# Patient Record
Sex: MALE | Race: BLACK OR AFRICAN AMERICAN | Employment: FULL TIME | URBAN - METROPOLITAN AREA
[De-identification: names, ages, dates, MRNs, and addresses within clinical notes are randomized per-mention and may not be internally consistent; named-entity substitution may affect disease eponyms.]

---

## 2019-04-08 ENCOUNTER — OFFICE VISIT (OUTPATIENT)
Dept: FAMILY MEDICINE CLINIC | Age: 49
End: 2019-04-08
Payer: COMMERCIAL

## 2019-04-08 VITALS
SYSTOLIC BLOOD PRESSURE: 156 MMHG | DIASTOLIC BLOOD PRESSURE: 96 MMHG | WEIGHT: 205 LBS | HEIGHT: 72 IN | BODY MASS INDEX: 27.77 KG/M2 | HEART RATE: 86 BPM

## 2019-04-08 DIAGNOSIS — F43.9 STRESS AT HOME: ICD-10-CM

## 2019-04-08 DIAGNOSIS — K03.81 CRACKED TOOTH: ICD-10-CM

## 2019-04-08 DIAGNOSIS — Z12.11 SCREENING FOR COLON CANCER: Primary | ICD-10-CM

## 2019-04-08 DIAGNOSIS — I10 HYPERTENSION, UNSPECIFIED TYPE: ICD-10-CM

## 2019-04-08 LAB
A/G RATIO: 1.4 (ref 1.1–2.2)
ALBUMIN SERPL-MCNC: 4.5 G/DL (ref 3.4–5)
ALP BLD-CCNC: 77 U/L (ref 40–129)
ALT SERPL-CCNC: 17 U/L (ref 10–40)
ANION GAP SERPL CALCULATED.3IONS-SCNC: 12 MMOL/L (ref 3–16)
AST SERPL-CCNC: 21 U/L (ref 15–37)
BILIRUB SERPL-MCNC: 0.5 MG/DL (ref 0–1)
BUN BLDV-MCNC: 13 MG/DL (ref 7–20)
CALCIUM SERPL-MCNC: 9.2 MG/DL (ref 8.3–10.6)
CHLORIDE BLD-SCNC: 98 MMOL/L (ref 99–110)
CO2: 28 MMOL/L (ref 21–32)
CREAT SERPL-MCNC: 0.7 MG/DL (ref 0.9–1.3)
GFR AFRICAN AMERICAN: >60
GFR NON-AFRICAN AMERICAN: >60
GLOBULIN: 3.3 G/DL
GLUCOSE BLD-MCNC: 236 MG/DL (ref 70–99)
HCT VFR BLD CALC: 39.5 % (ref 40.5–52.5)
HEMOGLOBIN: 12.4 G/DL (ref 13.5–17.5)
MCH RBC QN AUTO: 22.2 PG (ref 26–34)
MCHC RBC AUTO-ENTMCNC: 31.5 G/DL (ref 31–36)
MCV RBC AUTO: 70.7 FL (ref 80–100)
PDW BLD-RTO: 14.7 % (ref 12.4–15.4)
PLATELET # BLD: 283 K/UL (ref 135–450)
PMV BLD AUTO: 8.9 FL (ref 5–10.5)
POTASSIUM SERPL-SCNC: 4.6 MMOL/L (ref 3.5–5.1)
RBC # BLD: 5.59 M/UL (ref 4.2–5.9)
SODIUM BLD-SCNC: 138 MMOL/L (ref 136–145)
TOTAL PROTEIN: 7.8 G/DL (ref 6.4–8.2)
TSH SERPL DL<=0.05 MIU/L-ACNC: 1.55 UIU/ML (ref 0.27–4.2)
WBC # BLD: 4.4 K/UL (ref 4–11)

## 2019-04-08 PROCEDURE — 36415 COLL VENOUS BLD VENIPUNCTURE: CPT | Performed by: FAMILY MEDICINE

## 2019-04-08 PROCEDURE — 99203 OFFICE O/P NEW LOW 30 MIN: CPT | Performed by: FAMILY MEDICINE

## 2019-04-08 RX ORDER — AMLODIPINE BESYLATE 5 MG/1
5 TABLET ORAL DAILY
Qty: 30 TABLET | Refills: 0 | Status: SHIPPED | OUTPATIENT
Start: 2019-04-08 | End: 2019-05-08

## 2019-04-08 SDOH — HEALTH STABILITY: MENTAL HEALTH: HOW OFTEN DO YOU HAVE A DRINK CONTAINING ALCOHOL?: NEVER

## 2019-04-08 ASSESSMENT — PATIENT HEALTH QUESTIONNAIRE - PHQ9
SUM OF ALL RESPONSES TO PHQ9 QUESTIONS 1 & 2: 0
SUM OF ALL RESPONSES TO PHQ QUESTIONS 1-9: 0
2. FEELING DOWN, DEPRESSED OR HOPELESS: 0
SUM OF ALL RESPONSES TO PHQ QUESTIONS 1-9: 0
2. FEELING DOWN, DEPRESSED OR HOPELESS: 0
SUM OF ALL RESPONSES TO PHQ QUESTIONS 1-9: 0
1. LITTLE INTEREST OR PLEASURE IN DOING THINGS: 0
SUM OF ALL RESPONSES TO PHQ QUESTIONS 1-9: 0

## 2019-04-08 ASSESSMENT — ENCOUNTER SYMPTOMS
VOMITING: 0
TROUBLE SWALLOWING: 0
RHINORRHEA: 0
SINUS PRESSURE: 0
DIARRHEA: 0
FACIAL SWELLING: 0
SHORTNESS OF BREATH: 0
COUGH: 0
ANAL BLEEDING: 0
EYE DISCHARGE: 0
ABDOMINAL PAIN: 0
WHEEZING: 0
SORE THROAT: 0
NAUSEA: 0
BLOOD IN STOOL: 0
CHEST TIGHTNESS: 0

## 2019-04-08 NOTE — PROGRESS NOTES
2019    Lalo Moss (:  1970) is a 46 y.o. male, here to establish care with the PCP. Patient stated overall he feels well but he is here for  evaluation of the following medical concerns:    1. HTN- patient stated that he has not had this before, is not on medication, believes it is elevated today due to several concerns, including a cracked tooth, stress at home, and the fact that he had poor diet. He  Is not on medication. He has lost over 100 pounds over the past several years. He denied chest pain, sob, or syncope. 2.  Screening colonoscopy- has not had this and needs an order to have the procedure. 3.  Teeth- apparently has a \"cracked\" tooth and has a dental appointment this week. 4.  Overweight- again patient lost over 100 pounds in the past.  Is trying to eat a balanced healthy diet but recently had a diet high in sodium. HPI    Review of Systems   Constitutional: Negative for activity change, fatigue, fever and unexpected weight change. HENT: Negative for congestion, ear pain, facial swelling, hearing loss, rhinorrhea, sinus pressure, sore throat and trouble swallowing. Eyes: Negative for discharge and visual disturbance. Respiratory: Negative for cough, chest tightness, shortness of breath and wheezing. Cardiovascular: Negative for chest pain, palpitations and leg swelling. Gastrointestinal: Negative for abdominal pain, anal bleeding, blood in stool, diarrhea, nausea and vomiting. Endocrine: Negative for cold intolerance, heat intolerance, polydipsia and polyphagia. Genitourinary: Negative for decreased urine volume, discharge, dysuria, frequency, genital sores, penile pain, testicular pain and urgency. Musculoskeletal: Negative for arthralgias. Skin: Negative for rash. Allergic/Immunologic: Negative for food allergies. Neurological: Positive for headaches. Negative for dizziness, syncope and light-headedness.         New glasses today Position:  Supine Sitting   Cuff Size:  Medium Adult Medium Adult   Pulse: 86     Weight: 205 lb (93 kg)     Height: 6' (1.829 m)       Estimated body mass index is 27.8 kg/m² as calculated from the following:    Height as of this encounter: 6' (1.829 m). Weight as of this encounter: 205 lb (93 kg). Chemistry    No results found for: NA, K, CL, CO2, BUN, CREATININE No results found for: CALCIUM, ALKPHOS, AST, ALT, BILITOT       No results found for: WBC, HGB, HCT, MCV, PLT  Lab Results   Component Value Date    LABA1C 8.4 05/14/2013     Lab Results   Component Value Date    .4 05/14/2013     Lab Results   Component Value Date    LABA1C 8.4 05/14/2013     No components found for: CHLPL  No results found for: TRIG  No results found for: HDL  No results found for: LDLCALC  No results found for: LABVLDL    Physical Exam   Constitutional: He is oriented to person, place, and time. He appears well-developed and well-nourished. HENT:   Head: Normocephalic and atraumatic. Right Ear: External ear normal.   Left Ear: External ear normal.   Nose: Nose normal.   Mouth/Throat: Oropharynx is clear and moist. No oropharyngeal exudate. Eyes: Pupils are equal, round, and reactive to light. Conjunctivae are normal.   Neck: Neck supple. No thyromegaly present. Cardiovascular: Normal rate, regular rhythm, normal heart sounds and intact distal pulses. No murmur heard. Pulmonary/Chest: Effort normal and breath sounds normal. He has no wheezes. He has no rales. Abdominal: Soft. Bowel sounds are normal. There is no tenderness. There is no guarding. Musculoskeletal: Normal range of motion. He exhibits no edema. Lymphadenopathy:     He has no cervical adenopathy. Neurological: He is alert and oriented to person, place, and time. He displays normal reflexes. No cranial nerve deficit. Skin: No rash noted. He is not diaphoretic. Psychiatric: He has a normal mood and affect.  His behavior is normal. Judgment and thought content normal.       ASSESSMENT/PLAN:  1. Screening for colon cancer  Patient will have colonoscopy ordered. 2. Cracked tooth  Stable  He has an appointment with a Dentist this week. 3. Stress at home  Stable  He believes this is associated with his HTN at this time. 4. Hypertension, unspecified type  Had three readings in the office, no other readings. Educated the patient on the need to check BP at home. The patient's BP is elevated and concerned due to his past problems I prescribed a blood pressure medication and informed the patient to check it tomorrow and let me know the reading if elevated we will start the medication. Keep a log of BP readings. Monitor sodium, diet, and exercise. He had his questions answered during the visit. He will rtc in three months. If BP is still elevated please rtc sooner. No follow-ups on file.

## 2019-04-08 NOTE — PATIENT INSTRUCTIONS
Please check BP and keep a log. Send me a message  Please consider BP medication if your numbers do not improve, very important. Reduce sodium and monitor your diet. Colonoscopy has been ordered. Please rtc in three months sooner if BP is elevated.

## 2019-05-07 ENCOUNTER — TELEPHONE (OUTPATIENT)
Dept: FAMILY MEDICINE CLINIC | Age: 49
End: 2019-05-07

## 2019-05-07 NOTE — TELEPHONE ENCOUNTER
Patient was given the results of the labs from 4/8/19. Patient informed that Dr. Mesfin Espitia wants him to schedule a lab visit for lipids, Hgb A1c, and for BP check. Patient states he works long hours and doesn't know when he will be able to schedule. JAYYI.

## 2021-10-05 ENCOUNTER — HOSPITAL ENCOUNTER (OUTPATIENT)
Dept: GENERAL RADIOLOGY | Age: 51
Discharge: HOME | End: 2021-10-05

## 2021-10-05 ENCOUNTER — HOSPITAL ENCOUNTER (OUTPATIENT)
Age: 51
Discharge: HOME | End: 2021-10-05

## 2021-10-05 DIAGNOSIS — S10.93XA CONTUSION OF NECK, INITIAL ENCOUNTER: ICD-10-CM

## 2021-10-05 PROCEDURE — 72040 X-RAY EXAM NECK SPINE 2-3 VW: CPT

## 2024-06-15 ENCOUNTER — APPOINTMENT (OUTPATIENT)
Dept: GENERAL RADIOLOGY | Age: 54
DRG: 617 | End: 2024-06-15
Payer: COMMERCIAL

## 2024-06-15 ENCOUNTER — HOSPITAL ENCOUNTER (INPATIENT)
Age: 54
LOS: 5 days | Discharge: HOME | DRG: 617 | End: 2024-06-20
Attending: INTERNAL MEDICINE | Admitting: INTERNAL MEDICINE
Payer: COMMERCIAL

## 2024-06-15 DIAGNOSIS — R79.89 ELEVATED D-DIMER: ICD-10-CM

## 2024-06-15 DIAGNOSIS — M25.471 RIGHT ANKLE SWELLING: ICD-10-CM

## 2024-06-15 DIAGNOSIS — M86.171 ACUTE OSTEOMYELITIS OF RIGHT FOOT (HCC): ICD-10-CM

## 2024-06-15 DIAGNOSIS — I10 UNCONTROLLED HYPERTENSION: ICD-10-CM

## 2024-06-15 DIAGNOSIS — Z91.148 H/O MEDICATION NONCOMPLIANCE: ICD-10-CM

## 2024-06-15 DIAGNOSIS — M86.171 OTHER ACUTE OSTEOMYELITIS OF RIGHT FOOT (HCC): Primary | ICD-10-CM

## 2024-06-15 DIAGNOSIS — E11.65 TYPE 2 DIABETES MELLITUS WITH HYPERGLYCEMIA, UNSPECIFIED WHETHER LONG TERM INSULIN USE (HCC): ICD-10-CM

## 2024-06-15 PROBLEM — M86.9 OSTEOMYELITIS (HCC): Status: ACTIVE | Noted: 2024-06-15

## 2024-06-15 LAB
ALBUMIN SERPL-MCNC: 3.6 G/DL (ref 3.4–5)
ALBUMIN/GLOB SERPL: 0.9 {RATIO} (ref 1.1–2.2)
ALP SERPL-CCNC: 91 U/L (ref 40–129)
ALT SERPL-CCNC: 18 U/L (ref 10–40)
ANION GAP SERPL CALCULATED.3IONS-SCNC: 10 MMOL/L (ref 3–16)
ANISOCYTOSIS BLD QL SMEAR: ABNORMAL
AST SERPL-CCNC: 27 U/L (ref 15–37)
BACTERIA URNS QL MICRO: ABNORMAL /HPF
BASE EXCESS BLDV CALC-SCNC: 3.8 MMOL/L
BASOPHILS # BLD: 0.1 K/UL (ref 0–0.2)
BASOPHILS NFR BLD: 1.4 %
BILIRUB SERPL-MCNC: 0.4 MG/DL (ref 0–1)
BILIRUB UR QL STRIP.AUTO: NEGATIVE
BUN SERPL-MCNC: 14 MG/DL (ref 7–20)
CALCIUM SERPL-MCNC: 8.8 MG/DL (ref 8.3–10.6)
CHLORIDE SERPL-SCNC: 100 MMOL/L (ref 99–110)
CLARITY UR: CLEAR
CO2 BLDV-SCNC: 32 MMOL/L
CO2 SERPL-SCNC: 24 MMOL/L (ref 21–32)
COHGB MFR BLDV: 1.4 %
COLOR UR: YELLOW
CREAT SERPL-MCNC: 0.9 MG/DL (ref 0.9–1.3)
CRP SERPL-MCNC: 29.4 MG/L (ref 0–5.1)
D-DIMER QUANTITATIVE: 1.19 UG/ML FEU (ref 0–0.6)
DEPRECATED RDW RBC AUTO: 16 % (ref 12.4–15.4)
EOSINOPHIL # BLD: 0.5 K/UL (ref 0–0.6)
EOSINOPHIL NFR BLD: 6.2 %
EPI CELLS #/AREA URNS AUTO: 0 /HPF (ref 0–5)
ERYTHROCYTE [SEDIMENTATION RATE] IN BLOOD BY WESTERGREN METHOD: 55 MM/HR (ref 0–20)
GFR SERPLBLD CREATININE-BSD FMLA CKD-EPI: >90 ML/MIN/{1.73_M2}
GLUCOSE BLD-MCNC: 211 MG/DL (ref 70–99)
GLUCOSE SERPL-MCNC: 185 MG/DL (ref 70–99)
GLUCOSE UR STRIP.AUTO-MCNC: 100 MG/DL
HCO3 BLDV-SCNC: 30 MMOL/L (ref 23–29)
HCT VFR BLD AUTO: 35.6 % (ref 40.5–52.5)
HGB BLD-MCNC: 11 G/DL (ref 13.5–17.5)
HGB UR QL STRIP.AUTO: ABNORMAL
HYALINE CASTS #/AREA URNS AUTO: 0 /LPF (ref 0–8)
KETONES UR STRIP.AUTO-MCNC: NEGATIVE MG/DL
LACTATE BLDV-SCNC: 1.2 MMOL/L (ref 0.4–2)
LEUKOCYTE ESTERASE UR QL STRIP.AUTO: NEGATIVE
LYMPHOCYTES # BLD: 1.9 K/UL (ref 1–5.1)
LYMPHOCYTES NFR BLD: 25.9 %
MCH RBC QN AUTO: 21.3 PG (ref 26–34)
MCHC RBC AUTO-ENTMCNC: 31 G/DL (ref 31–36)
MCV RBC AUTO: 68.6 FL (ref 80–100)
METHGB MFR BLDV: 0.6 %
MICROCYTES BLD QL SMEAR: ABNORMAL
MONOCYTES # BLD: 0.6 K/UL (ref 0–1.3)
MONOCYTES NFR BLD: 8.4 %
NEUTROPHILS # BLD: 4.4 K/UL (ref 1.7–7.7)
NEUTROPHILS NFR BLD: 58.1 %
NITRITE UR QL STRIP.AUTO: NEGATIVE
NT-PROBNP SERPL-MCNC: 388 PG/ML (ref 0–124)
O2 THERAPY: ABNORMAL
OVALOCYTES BLD QL SMEAR: ABNORMAL
PATH INTERP BLD-IMP: YES
PCO2 BLDV: 51.5 MMHG (ref 40–50)
PERFORMED ON: ABNORMAL
PH BLDV: 7.37 [PH] (ref 7.35–7.45)
PH UR STRIP.AUTO: 7.5 [PH] (ref 5–8)
PLATELET # BLD AUTO: 328 K/UL (ref 135–450)
PLATELET BLD QL SMEAR: ADEQUATE
PMV BLD AUTO: 8.6 FL (ref 5–10.5)
PO2 BLDV: 33 MMHG
POIKILOCYTOSIS BLD QL SMEAR: ABNORMAL
POTASSIUM SERPL-SCNC: 4.3 MMOL/L (ref 3.5–5.1)
PROT SERPL-MCNC: 7.7 G/DL (ref 6.4–8.2)
PROT UR STRIP.AUTO-MCNC: ABNORMAL MG/DL
RBC # BLD AUTO: 5.18 M/UL (ref 4.2–5.9)
RBC CLUMPS #/AREA URNS AUTO: 9 /HPF (ref 0–4)
SAO2 % BLDV: 61 %
SLIDE REVIEW: ABNORMAL
SODIUM SERPL-SCNC: 134 MMOL/L (ref 136–145)
SP GR UR STRIP.AUTO: 1.01 (ref 1–1.03)
UA COMPLETE W REFLEX CULTURE PNL UR: ABNORMAL
UA DIPSTICK W REFLEX MICRO PNL UR: YES
URN SPEC COLLECT METH UR: ABNORMAL
UROBILINOGEN UR STRIP-ACNC: 0.2 E.U./DL
WBC # BLD AUTO: 7.5 K/UL (ref 4–11)
WBC #/AREA URNS AUTO: 0 /HPF (ref 0–5)

## 2024-06-15 PROCEDURE — 1200000000 HC SEMI PRIVATE

## 2024-06-15 PROCEDURE — 83605 ASSAY OF LACTIC ACID: CPT

## 2024-06-15 PROCEDURE — 85379 FIBRIN DEGRADATION QUANT: CPT

## 2024-06-15 PROCEDURE — 83880 ASSAY OF NATRIURETIC PEPTIDE: CPT

## 2024-06-15 PROCEDURE — 6360000002 HC RX W HCPCS: Performed by: PHYSICIAN ASSISTANT

## 2024-06-15 PROCEDURE — 6360000002 HC RX W HCPCS: Performed by: INTERNAL MEDICINE

## 2024-06-15 PROCEDURE — 85025 COMPLETE CBC W/AUTO DIFF WBC: CPT

## 2024-06-15 PROCEDURE — 2580000003 HC RX 258: Performed by: PHYSICIAN ASSISTANT

## 2024-06-15 PROCEDURE — 2580000003 HC RX 258: Performed by: INTERNAL MEDICINE

## 2024-06-15 PROCEDURE — 73630 X-RAY EXAM OF FOOT: CPT

## 2024-06-15 PROCEDURE — 85652 RBC SED RATE AUTOMATED: CPT

## 2024-06-15 PROCEDURE — 6360000002 HC RX W HCPCS: Performed by: REGISTERED NURSE

## 2024-06-15 PROCEDURE — 36415 COLL VENOUS BLD VENIPUNCTURE: CPT

## 2024-06-15 PROCEDURE — 82803 BLOOD GASES ANY COMBINATION: CPT

## 2024-06-15 PROCEDURE — 80053 COMPREHEN METABOLIC PANEL: CPT

## 2024-06-15 PROCEDURE — 73610 X-RAY EXAM OF ANKLE: CPT

## 2024-06-15 PROCEDURE — 81001 URINALYSIS AUTO W/SCOPE: CPT

## 2024-06-15 PROCEDURE — 86140 C-REACTIVE PROTEIN: CPT

## 2024-06-15 PROCEDURE — 87040 BLOOD CULTURE FOR BACTERIA: CPT

## 2024-06-15 PROCEDURE — 99285 EMERGENCY DEPT VISIT HI MDM: CPT

## 2024-06-15 PROCEDURE — 83036 HEMOGLOBIN GLYCOSYLATED A1C: CPT

## 2024-06-15 RX ORDER — SODIUM CHLORIDE 0.9 % (FLUSH) 0.9 %
5-40 SYRINGE (ML) INJECTION PRN
Status: DISCONTINUED | OUTPATIENT
Start: 2024-06-15 | End: 2024-06-20 | Stop reason: HOSPADM

## 2024-06-15 RX ORDER — ONDANSETRON 4 MG/1
4 TABLET, ORALLY DISINTEGRATING ORAL EVERY 8 HOURS PRN
Status: DISCONTINUED | OUTPATIENT
Start: 2024-06-15 | End: 2024-06-20 | Stop reason: HOSPADM

## 2024-06-15 RX ORDER — POTASSIUM CHLORIDE 20 MEQ/1
40 TABLET, EXTENDED RELEASE ORAL PRN
Status: DISCONTINUED | OUTPATIENT
Start: 2024-06-15 | End: 2024-06-20 | Stop reason: HOSPADM

## 2024-06-15 RX ORDER — ENOXAPARIN SODIUM 100 MG/ML
30 INJECTION SUBCUTANEOUS 2 TIMES DAILY
Status: DISCONTINUED | OUTPATIENT
Start: 2024-06-15 | End: 2024-06-15

## 2024-06-15 RX ORDER — ENOXAPARIN SODIUM 100 MG/ML
1 INJECTION SUBCUTANEOUS 2 TIMES DAILY
Status: DISCONTINUED | OUTPATIENT
Start: 2024-06-15 | End: 2024-06-18

## 2024-06-15 RX ORDER — SODIUM CHLORIDE 9 MG/ML
INJECTION, SOLUTION INTRAVENOUS PRN
Status: DISCONTINUED | OUTPATIENT
Start: 2024-06-15 | End: 2024-06-20 | Stop reason: HOSPADM

## 2024-06-15 RX ORDER — SODIUM CHLORIDE 0.9 % (FLUSH) 0.9 %
5-40 SYRINGE (ML) INJECTION EVERY 12 HOURS SCHEDULED
Status: DISCONTINUED | OUTPATIENT
Start: 2024-06-15 | End: 2024-06-20 | Stop reason: HOSPADM

## 2024-06-15 RX ORDER — ACETAMINOPHEN 325 MG/1
650 TABLET ORAL EVERY 6 HOURS PRN
Status: DISCONTINUED | OUTPATIENT
Start: 2024-06-15 | End: 2024-06-20 | Stop reason: HOSPADM

## 2024-06-15 RX ORDER — ACETAMINOPHEN 650 MG/1
650 SUPPOSITORY RECTAL EVERY 6 HOURS PRN
Status: DISCONTINUED | OUTPATIENT
Start: 2024-06-15 | End: 2024-06-15

## 2024-06-15 RX ORDER — HYDRALAZINE HYDROCHLORIDE 20 MG/ML
10 INJECTION INTRAMUSCULAR; INTRAVENOUS EVERY 6 HOURS PRN
Status: DISCONTINUED | OUTPATIENT
Start: 2024-06-15 | End: 2024-06-20 | Stop reason: HOSPADM

## 2024-06-15 RX ORDER — POTASSIUM CHLORIDE 7.45 MG/ML
10 INJECTION INTRAVENOUS PRN
Status: DISCONTINUED | OUTPATIENT
Start: 2024-06-15 | End: 2024-06-20 | Stop reason: HOSPADM

## 2024-06-15 RX ORDER — SODIUM CHLORIDE 9 MG/ML
INJECTION, SOLUTION INTRAVENOUS CONTINUOUS
Status: ACTIVE | OUTPATIENT
Start: 2024-06-15 | End: 2024-06-16

## 2024-06-15 RX ORDER — ONDANSETRON 2 MG/ML
4 INJECTION INTRAMUSCULAR; INTRAVENOUS EVERY 6 HOURS PRN
Status: DISCONTINUED | OUTPATIENT
Start: 2024-06-15 | End: 2024-06-20 | Stop reason: HOSPADM

## 2024-06-15 RX ORDER — INSULIN LISPRO 100 [IU]/ML
0-8 INJECTION, SOLUTION INTRAVENOUS; SUBCUTANEOUS
Status: DISCONTINUED | OUTPATIENT
Start: 2024-06-16 | End: 2024-06-20 | Stop reason: HOSPADM

## 2024-06-15 RX ORDER — INSULIN LISPRO 100 [IU]/ML
0-4 INJECTION, SOLUTION INTRAVENOUS; SUBCUTANEOUS NIGHTLY
Status: DISCONTINUED | OUTPATIENT
Start: 2024-06-15 | End: 2024-06-20 | Stop reason: HOSPADM

## 2024-06-15 RX ORDER — MAGNESIUM HYDROXIDE/ALUMINUM HYDROXICE/SIMETHICONE 120; 1200; 1200 MG/30ML; MG/30ML; MG/30ML
30 SUSPENSION ORAL EVERY 6 HOURS PRN
Status: DISCONTINUED | OUTPATIENT
Start: 2024-06-15 | End: 2024-06-20 | Stop reason: HOSPADM

## 2024-06-15 RX ORDER — POLYETHYLENE GLYCOL 3350 17 G/17G
17 POWDER, FOR SOLUTION ORAL DAILY PRN
Status: DISCONTINUED | OUTPATIENT
Start: 2024-06-15 | End: 2024-06-20 | Stop reason: HOSPADM

## 2024-06-15 RX ADMIN — ENOXAPARIN SODIUM 100 MG: 100 INJECTION SUBCUTANEOUS at 23:17

## 2024-06-15 RX ADMIN — SODIUM CHLORIDE: 9 INJECTION, SOLUTION INTRAVENOUS at 23:05

## 2024-06-15 RX ADMIN — SODIUM CHLORIDE, PRESERVATIVE FREE 10 ML: 5 INJECTION INTRAVENOUS at 23:08

## 2024-06-15 RX ADMIN — HYDRALAZINE HYDROCHLORIDE 10 MG: 20 INJECTION INTRAMUSCULAR; INTRAVENOUS at 23:05

## 2024-06-15 RX ADMIN — VANCOMYCIN HYDROCHLORIDE 1500 MG: 1.5 INJECTION, POWDER, LYOPHILIZED, FOR SOLUTION INTRAVENOUS at 23:07

## 2024-06-15 ASSESSMENT — LIFESTYLE VARIABLES
HOW OFTEN DO YOU HAVE A DRINK CONTAINING ALCOHOL: NEVER
HOW OFTEN DO YOU HAVE A DRINK CONTAINING ALCOHOL: NEVER
HOW MANY STANDARD DRINKS CONTAINING ALCOHOL DO YOU HAVE ON A TYPICAL DAY: PATIENT DOES NOT DRINK
HOW MANY STANDARD DRINKS CONTAINING ALCOHOL DO YOU HAVE ON A TYPICAL DAY: PATIENT DECLINED

## 2024-06-15 ASSESSMENT — PAIN SCALES - GENERAL: PAINLEVEL_OUTOF10: 0

## 2024-06-15 NOTE — H&P
V2.0  History and Physical      Name:  Srini Recio /Age/Sex: 1970  (53 y.o. male)   MRN & CSN:  [id number] & [id number] Encounter Date/Time: 6/15/2024 7:48 PM EDT   Location:   PCP: Beto Espinoza DO       Hospital Day: 1    Assessment and Plan:   Srini Recio is a 53 y.o. male with a pmh of DM, HTN who presents with <principal problem not specified>        Assessment :  Right foot osteomyelitis   Suspected Right LL DVT   -ESR= 55  -X-ray: Diffuse soft tissue swelling with a soft tissue defect of the distal  aspect of the 2nd toe. Suspected erosion of the tuft of the 2nd distal  phalanx concerning for osteomyelitis  -D dimer=1.19  Uncontrolled DM   Uncontrolled HTN     Plan:  -Admit to medical floor on tele   -2 sets of blood culture   -Empirical anabiotic (Vancomycin and Zosyn )  -CT Rt foot   -Podiatry consult   - Given the high clinical suspicion of DVT with elevated dimer will start on therapeutic dose Lovenox and order duplex U/S lower extremities   -Resume home BP medication   -Moderate dose insulin sliding scale   -HbA1c     Disposition:   Current Living situation: Home with family   Expected Disposition: Home with family   Estimated D/C: 3 days     Diet No diet orders on file   DVT Prophylaxis [x] Lovenox, []  Heparin, [] SCDs, [] Ambulation,  [] Eliquis, [] Xarelto, [] Coumadin   Code Status No Order   Surrogate Decision Maker/ POA      Personally reviewed Lab Studies and Imaging     Discussed management of the case with ED physician  who recommended admission     Imaging that was interpreted personally includes X-Ray and results diffuse tissue swelling with concern of osteomyelitis involving  the right 2nd distal phalanx     Drugs that require monitoring for toxicity include vancomycin  and the method of monitoring was vancomycin level          History from:     patient    History of Present Illness:     Chief Complaint: right ankle swelling   Srini Recio is a 53 y.o. male with  pmh of DM , HTN who came in because he had 2 days swelling of his right foot and ankle   Swelling was gradual onset and did not respond to leg elevation or over the counter diuretics. Did not has injury of the foot no fever   He thought he was dealing with a fungal infection on the foot but has some wound breakdown around the second toe that he does not really know how long it has been there. He has been noncompliant with medications (for BP and diabetes) for about 5 months due to issues with his work schedule.   Review of Systems:      Pertinent positives and negatives discussed in HPI     Objective:   No intake or output data in the 24 hours ending 06/15/24 1948   Vitals:   Vitals:    06/15/24 1700 06/15/24 1706   BP: (!) 208/107 (!) 209/119   Pulse: 72    Resp: 14    Temp: 98.2 °F (36.8 °C)    TempSrc: Oral    SpO2: 99%    Weight: 110 kg (242 lb 8.1 oz)        Medications Prior to Admission     Prior to Admission medications    Not on File       Physical Exam:    Physical Exam  Constitutional:       Appearance: Normal appearance.   HENT:      Head: Normocephalic.   Cardiovascular:      Rate and Rhythm: Normal rate and regular rhythm.   Pulmonary:      Effort: Pulmonary effort is normal.      Breath sounds: Normal breath sounds.   Abdominal:      General: Abdomen is flat. Bowel sounds are normal.      Palpations: Abdomen is soft.   Musculoskeletal:         General: Swelling present. No tenderness.      Right lower leg: Edema present.             Past Medical History:   PMHx History reviewed. No pertinent past medical history.  PSHX:  has a past surgical history that includes hernia repair.  Allergies: No Known Allergies  Fam HX: family history includes Alcohol Abuse in his father.  Soc HX:   Social History     Socioeconomic History    Marital status:      Spouse name: None    Number of children: None    Years of education: None    Highest education level: None   Tobacco Use    Smoking status: Never

## 2024-06-15 NOTE — ED PROVIDER NOTES
on file.     EMERGENCY DEPARTMENT COURSE and DIFFERENTIAL DIAGNOSIS/MDM:   Vitals:    Vitals:    06/15/24 1700 06/15/24 1706   BP: (!) 208/107 (!) 209/119   Pulse: 72    Resp: 14    Temp: 98.2 °F (36.8 °C)    TempSrc: Oral    SpO2: 99%    Weight: 110 kg (242 lb 8.1 oz)        Patient was given the following medications:  Medications   0.9 % sodium chloride infusion (has no administration in time range)   sodium chloride flush 0.9 % injection 5-40 mL (has no administration in time range)   sodium chloride flush 0.9 % injection 5-40 mL (has no administration in time range)   0.9 % sodium chloride infusion (has no administration in time range)   potassium chloride (KLOR-CON M) extended release tablet 40 mEq (has no administration in time range)     Or   potassium bicarb-citric acid (EFFER-K) effervescent tablet 40 mEq (has no administration in time range)     Or   potassium chloride 10 mEq/100 mL IVPB (Peripheral Line) (has no administration in time range)   enoxaparin Sodium (LOVENOX) injection 30 mg (has no administration in time range)   ondansetron (ZOFRAN-ODT) disintegrating tablet 4 mg (has no administration in time range)     Or   ondansetron (ZOFRAN) injection 4 mg (has no administration in time range)   polyethylene glycol (GLYCOLAX) packet 17 g (has no administration in time range)   aluminum & magnesium hydroxide-simethicone (MAALOX) 200-200-20 MG/5ML suspension 30 mL (has no administration in time range)   acetaminophen (TYLENOL) tablet 650 mg (has no administration in time range)     Or   acetaminophen (TYLENOL) suppository 650 mg (has no administration in time range)   insulin lispro (HUMALOG,ADMELOG) injection vial 0-8 Units (has no administration in time range)   insulin lispro (HUMALOG,ADMELOG) injection vial 0-4 Units (has no administration in time range)   vancomycin 1000 mg IVPB in 250 mL NS addavial (has no administration in time range)   ceFEPIme (MAXIPIME) 2,000 mg in sodium chloride 0.9 % 100 mL  noncompliance          DISPOSITION/PLAN     DISPOSITION Admitted 06/15/2024 07:58:21 PM      PATIENT REFERRED TO:  No follow-up provider specified.    DISCHARGE MEDICATIONS:  New Prescriptions    No medications on file       DISCONTINUED MEDICATIONS:  Discontinued Medications    AMLODIPINE (NORVASC) 5 MG TABLET    Take 1 tablet by mouth daily              (Please note that portions of this note were completed with a voice recognition program.  Efforts were made to edit the dictations but occasionally words are mis-transcribed.)    MACKENZIE Torres (electronically signed)            Kavita Mejia PA  06/15/24 2020

## 2024-06-15 NOTE — ED TRIAGE NOTES
R foot swelling since yesterday. Patient previously taking lisinopril, diuretic and metformin, but no longer sees his PCP. Has not taken these medications regularly in 4-5 months. /107. States his weight is up about 20lbs.

## 2024-06-16 ENCOUNTER — APPOINTMENT (OUTPATIENT)
Dept: CT IMAGING | Age: 54
DRG: 617 | End: 2024-06-16
Payer: COMMERCIAL

## 2024-06-16 LAB
EST. AVERAGE GLUCOSE BLD GHB EST-MCNC: 332.1 MG/DL
GLUCOSE BLD-MCNC: 156 MG/DL (ref 70–99)
GLUCOSE BLD-MCNC: 232 MG/DL (ref 70–99)
GLUCOSE BLD-MCNC: 279 MG/DL (ref 70–99)
HBA1C MFR BLD: 13.2 %
PERFORMED ON: ABNORMAL

## 2024-06-16 PROCEDURE — 1200000000 HC SEMI PRIVATE

## 2024-06-16 PROCEDURE — 90471 IMMUNIZATION ADMIN: CPT | Performed by: PHYSICIAN ASSISTANT

## 2024-06-16 PROCEDURE — 6360000002 HC RX W HCPCS: Performed by: REGISTERED NURSE

## 2024-06-16 PROCEDURE — 6360000004 HC RX CONTRAST MEDICATION: Performed by: INTERNAL MEDICINE

## 2024-06-16 PROCEDURE — 6370000000 HC RX 637 (ALT 250 FOR IP): Performed by: INTERNAL MEDICINE

## 2024-06-16 PROCEDURE — 94760 N-INVAS EAR/PLS OXIMETRY 1: CPT

## 2024-06-16 PROCEDURE — 6370000000 HC RX 637 (ALT 250 FOR IP): Performed by: REGISTERED NURSE

## 2024-06-16 PROCEDURE — 6360000002 HC RX W HCPCS: Performed by: STUDENT IN AN ORGANIZED HEALTH CARE EDUCATION/TRAINING PROGRAM

## 2024-06-16 PROCEDURE — 6360000002 HC RX W HCPCS: Performed by: INTERNAL MEDICINE

## 2024-06-16 PROCEDURE — 6360000002 HC RX W HCPCS: Performed by: PHYSICIAN ASSISTANT

## 2024-06-16 PROCEDURE — 90715 TDAP VACCINE 7 YRS/> IM: CPT | Performed by: PHYSICIAN ASSISTANT

## 2024-06-16 PROCEDURE — 2580000003 HC RX 258: Performed by: INTERNAL MEDICINE

## 2024-06-16 PROCEDURE — 9990000010 HC NO CHARGE VISIT

## 2024-06-16 PROCEDURE — 73701 CT LOWER EXTREMITY W/DYE: CPT

## 2024-06-16 PROCEDURE — 6370000000 HC RX 637 (ALT 250 FOR IP): Performed by: STUDENT IN AN ORGANIZED HEALTH CARE EDUCATION/TRAINING PROGRAM

## 2024-06-16 RX ORDER — VANCOMYCIN 1.75 G/350ML
1250 INJECTION, SOLUTION INTRAVENOUS EVERY 12 HOURS
Status: DISCONTINUED | OUTPATIENT
Start: 2024-06-16 | End: 2024-06-17

## 2024-06-16 RX ORDER — IBUPROFEN 400 MG/1
400 TABLET ORAL EVERY 6 HOURS PRN
Status: DISCONTINUED | OUTPATIENT
Start: 2024-06-16 | End: 2024-06-20 | Stop reason: HOSPADM

## 2024-06-16 RX ORDER — INSULIN LISPRO 100 [IU]/ML
6 INJECTION, SOLUTION INTRAVENOUS; SUBCUTANEOUS
Status: DISCONTINUED | OUTPATIENT
Start: 2024-06-16 | End: 2024-06-20 | Stop reason: HOSPADM

## 2024-06-16 RX ORDER — NIFEDIPINE 30 MG/1
30 TABLET, EXTENDED RELEASE ORAL DAILY
Status: DISCONTINUED | OUTPATIENT
Start: 2024-06-16 | End: 2024-06-20 | Stop reason: HOSPADM

## 2024-06-16 RX ORDER — GLUCAGON 1 MG/ML
1 KIT INJECTION PRN
Status: DISCONTINUED | OUTPATIENT
Start: 2024-06-16 | End: 2024-06-20 | Stop reason: HOSPADM

## 2024-06-16 RX ORDER — DEXTROSE MONOHYDRATE 100 MG/ML
INJECTION, SOLUTION INTRAVENOUS CONTINUOUS PRN
Status: DISCONTINUED | OUTPATIENT
Start: 2024-06-16 | End: 2024-06-20 | Stop reason: HOSPADM

## 2024-06-16 RX ORDER — INSULIN GLARGINE 100 [IU]/ML
20 INJECTION, SOLUTION SUBCUTANEOUS NIGHTLY
Status: DISCONTINUED | OUTPATIENT
Start: 2024-06-16 | End: 2024-06-20 | Stop reason: HOSPADM

## 2024-06-16 RX ADMIN — ENOXAPARIN SODIUM 100 MG: 100 INJECTION SUBCUTANEOUS at 20:12

## 2024-06-16 RX ADMIN — SODIUM CHLORIDE: 9 INJECTION, SOLUTION INTRAVENOUS at 17:09

## 2024-06-16 RX ADMIN — IBUPROFEN 400 MG: 400 TABLET, FILM COATED ORAL at 15:35

## 2024-06-16 RX ADMIN — TETANUS TOXOID, REDUCED DIPHTHERIA TOXOID AND ACELLULAR PERTUSSIS VACCINE, ADSORBED 0.5 ML: 5; 2.5; 8; 8; 2.5 SUSPENSION INTRAMUSCULAR at 00:54

## 2024-06-16 RX ADMIN — ACETAMINOPHEN 325MG 650 MG: 325 TABLET ORAL at 20:11

## 2024-06-16 RX ADMIN — IBUPROFEN 400 MG: 400 TABLET, FILM COATED ORAL at 02:30

## 2024-06-16 RX ADMIN — IOPAMIDOL 75 ML: 755 INJECTION, SOLUTION INTRAVENOUS at 09:29

## 2024-06-16 RX ADMIN — ENOXAPARIN SODIUM 100 MG: 100 INJECTION SUBCUTANEOUS at 11:21

## 2024-06-16 RX ADMIN — INSULIN LISPRO 4 UNITS: 100 INJECTION, SOLUTION INTRAVENOUS; SUBCUTANEOUS at 11:44

## 2024-06-16 RX ADMIN — HYDRALAZINE HYDROCHLORIDE 10 MG: 20 INJECTION INTRAMUSCULAR; INTRAVENOUS at 15:35

## 2024-06-16 RX ADMIN — VANCOMYCIN 1250 MG: 1.75 INJECTION, SOLUTION INTRAVENOUS at 11:47

## 2024-06-16 RX ADMIN — PIPERACILLIN AND TAZOBACTAM 3375 MG: 3; .375 INJECTION, POWDER, LYOPHILIZED, FOR SOLUTION INTRAVENOUS at 13:55

## 2024-06-16 RX ADMIN — PIPERACILLIN AND TAZOBACTAM 3375 MG: 3; .375 INJECTION, POWDER, LYOPHILIZED, FOR SOLUTION INTRAVENOUS at 22:23

## 2024-06-16 RX ADMIN — NIFEDIPINE 30 MG: 30 TABLET, EXTENDED RELEASE ORAL at 11:21

## 2024-06-16 RX ADMIN — PIPERACILLIN AND TAZOBACTAM 3375 MG: 3; .375 INJECTION, POWDER, LYOPHILIZED, FOR SOLUTION INTRAVENOUS at 05:22

## 2024-06-16 RX ADMIN — PIPERACILLIN AND TAZOBACTAM 4500 MG: 4; .5 INJECTION, POWDER, LYOPHILIZED, FOR SOLUTION INTRAVENOUS at 00:56

## 2024-06-16 RX ADMIN — INSULIN GLARGINE 20 UNITS: 100 INJECTION, SOLUTION SUBCUTANEOUS at 20:12

## 2024-06-16 ASSESSMENT — PAIN DESCRIPTION - DESCRIPTORS
DESCRIPTORS: ACHING

## 2024-06-16 ASSESSMENT — PAIN SCALES - GENERAL
PAINLEVEL_OUTOF10: 3
PAINLEVEL_OUTOF10: 4
PAINLEVEL_OUTOF10: 5
PAINLEVEL_OUTOF10: 6

## 2024-06-16 ASSESSMENT — PAIN DESCRIPTION - LOCATION
LOCATION: HEAD

## 2024-06-16 ASSESSMENT — PAIN - FUNCTIONAL ASSESSMENT: PAIN_FUNCTIONAL_ASSESSMENT: ACTIVITIES ARE NOT PREVENTED

## 2024-06-16 ASSESSMENT — PAIN DESCRIPTION - ORIENTATION: ORIENTATION: ANTERIOR

## 2024-06-16 NOTE — PROGRESS NOTES
4 Eyes Skin Assessment     NAME:  Srini Recio  YOB: 1970  MEDICAL RECORD NUMBER:  [id number]    The patient is being assessed for  Admission    I agree that at least one RN has performed a thorough Head to Toe Skin Assessment on the patient. ALL assessment sites listed below have been assessed.      Areas assessed by both nurses:    Head, Face, Ears, Shoulders, Back, Chest, Arms, Elbows, Hands, Sacrum. Buttock, Coccyx, Ischium, Legs. Feet and Heels, and Under Medical Devices         Does the Patient have a Wound? Yes wound(s) were present on assessment. LDA wound assessment was Initiated and completed by RN       Robe Prevention initiated by RN: Yes  Wound Care Orders initiated by RN: Yes    Pressure Injury (Stage 3,4, Unstageable, DTI, NWPT, and Complex wounds) if present, place Wound referral order by RN under : No    New Ostomies, if present place, Ostomy referral order under : No     Nurse 1 eSignature: Electronically signed by KHATIWADA,SWASTIKA, RN on 6/15/24 at 11:44 PM EDT    **SHARE this note so that the co-signing nurse can place an eSignature**    Nurse 2 eSignature: Electronically signed by Vidya Jennings RN on 6/15/24 at 11:44 PM EDT

## 2024-06-16 NOTE — PROGRESS NOTES
Physical Therapy  Chart Review and Discharge    24    Name: Srini Recio   : 1970    MRN: [id number]    PT order noted. Patient moving independently in room. No therapy needs at this time. Please re-order PT podiatry places a WB restriction on the patients RLE.    Electronically signed by Stevo Gunter PT on 2024 at 6:54 AM

## 2024-06-16 NOTE — PROGRESS NOTES
V2.0  [facility] Hospitalist Progress Note      Name:  Srini Recio /Age/Sex: 1970  (53 y.o. male)   MRN & CSN:  [id number] & [id number] Encounter Date/Time: 2024 12:33 PM EDT    Location:  [id number]-2369/4264-01 PCP: Beto Espinoza DO       Hospital Day: 2    Assessment and Plan:   Srini Recio is a 53 y.o. male with pmh of DM, HTN who presents with Osteomyelitis (HCC)      Plan:  Right foot osteomyelitis   Suspected Right LL DVT   -ESR= 55  -X-ray: Diffuse soft tissue swelling with a soft tissue defect of the distal  aspect of the 2nd toe. Suspected erosion of the tuft of the 2nd distal  phalanx concerning for osteomyelitis  -D dimer=1.19  Uncontrolled DM   Uncontrolled HTN      Plan:  -blood cultures obtained   -Empirical anabiotic (Vancomycin and Zosyn )  -CT Rt foot await results  -Podiatry consult   -Given the high clinical suspicion of DVT with elevated dimer will start on therapeutic dose Lovenox and order duplex U/S lower extremities   -Resume home BP medication   -Moderate dose insulin sliding scale  start basal bolus regimen weight based   -HbA1c  13.8    Diet ADULT DIET; Regular; Low Fat/Low Chol/High Fiber/2 gm Na   DVT Prophylaxis [] Lovenox, []  Heparin, [] SCDs, [] Ambulation,  [] Eliquis, [] Xarelto  [] Coumadin   Code Status Full Code   Disposition From: Home  Expected Disposition: TBD  Estimated Date of Discharge: 2-3 days  Patient requires continued admission due to R foot OM   Surrogate Decision Maker/ POA      Subjective:     Chief Complaint: Foot Swelling (R foot swelling since yesterday. Patient previously taking lisinopril, diuretic and metformin, but no longer sees his PCP. Has not taken these medications regularly in 4-5 months. /107. States his weight is up about 20lbs.)       Srini Recio is a 53 y.o. male who presents with R leg swelling seen and examined at bedside still has leg swelling some discomfort on palpation otherwise doing well, denies any fever or chills

## 2024-06-16 NOTE — PROGRESS NOTES
Patient received from ER, patient is Orient[facility]. Admission question completed with patient. Patient have a high blood pressure informed  to NP Debo Mg, given Prn hydralazine, wound care completed with xeroform kerlix and wound cleanser in rt. Second toe. Wound care consulted.

## 2024-06-16 NOTE — PLAN OF CARE
Problem: Discharge Planning  Goal: Discharge to home or other facility with appropriate resources  Outcome: Progressing  Flowsheets (Taken 6/15/2024 2232)  Discharge to home or other facility with appropriate resources:   Arrange for needed discharge resources and transportation as appropriate   Identify barriers to discharge with patient and caregiver     Problem: Pain  Goal: Verbalizes/displays adequate comfort level or baseline comfort level  Outcome: Progressing

## 2024-06-16 NOTE — CONSULTS
Clinical Pharmacy Note  Vancomycin Consult    Pharmacy consult received for one-time dose of vancomycin in the Emergency Department per Kavita Mejia.    Ht Readings from Last 1 Encounters:   04/08/19 1.829 m (6')        Wt Readings from Last 1 Encounters:   06/15/24 110 kg (242 lb 8.1 oz)         Assessment/Plan:  Vancomycin 1500 mg IV x 1 in ED.  If vancomycin is to continue on admission and pharmacy is to manage dosing, please re-consult with admission orders.    Joselyn Wiggins [facility],6/15/2024,8:30 PM

## 2024-06-16 NOTE — ED NOTES
ED handoff report provided to 4N RN. Patient to be transported to Room 4264 via stretcher. IV site clean, dry, and intact. Vitals stable. Patient updated on plan of care. All questions answered.

## 2024-06-16 NOTE — PROGRESS NOTES
Clinical Pharmacy Note  Vancomycin Consult    Srini Recio is a 53 y.o. male ordered vancomycin for SSTI; consult received from Dr. Ferreira to manage therapy. Also receiving Zosyn.    Allergies:  Patient has no known allergies.     Temp max:  Temp (24hrs), Av.3 °F (36.8 °C), Min:98.1 °F (36.7 °C), Max:98.4 °F (36.9 °C)      Recent Labs     06/15/24  1829   WBC 7.5       Recent Labs     06/15/24  1829   BUN 14   CREATININE 0.9       No intake or output data in the 24 hours ending 24 1125    Culture Results:  Blood cultures pending    Ht Readings from Last 1 Encounters:   06/15/24 1.829 m (6')        Wt Readings from Last 1 Encounters:   06/15/24 102 kg (224 lb 13.9 oz)         Estimated Creatinine Clearance: 115 mL/min (based on SCr of 0.9 mg/dL).    Assessment/Plan:  Day # 1 of vancomycin.  Pt received 1500 mg dose last night  Hx of DM, XR shows possible osteo. CT is pending     Given combination of Zosyn and Vanco will checking daily Scr and adjusting based on levels.    Vancomycin 1250 mg IV every 12 hours.    Goal -600  Predicted  (24-48) 458 (24,ss)      Thank you for the consult.   Alix Hernadez [facility]

## 2024-06-17 ENCOUNTER — HOSPITAL ENCOUNTER (INPATIENT)
Dept: VASCULAR LAB | Age: 54
Discharge: HOME | DRG: 617 | End: 2024-06-19
Attending: INTERNAL MEDICINE
Payer: COMMERCIAL

## 2024-06-17 LAB
CREAT SERPL-MCNC: 1 MG/DL (ref 0.9–1.3)
GFR SERPLBLD CREATININE-BSD FMLA CKD-EPI: 88 ML/MIN/{1.73_M2}
GLUCOSE BLD-MCNC: 120 MG/DL (ref 70–99)
GLUCOSE BLD-MCNC: 166 MG/DL (ref 70–99)
GLUCOSE BLD-MCNC: 199 MG/DL (ref 70–99)
GLUCOSE BLD-MCNC: 89 MG/DL (ref 70–99)
PATH INTERP BLD-IMP: NORMAL
PERFORMED ON: ABNORMAL
PERFORMED ON: NORMAL
VANCOMYCIN SERPL-MCNC: 13.4 UG/ML

## 2024-06-17 PROCEDURE — 2580000003 HC RX 258: Performed by: INTERNAL MEDICINE

## 2024-06-17 PROCEDURE — 6370000000 HC RX 637 (ALT 250 FOR IP): Performed by: STUDENT IN AN ORGANIZED HEALTH CARE EDUCATION/TRAINING PROGRAM

## 2024-06-17 PROCEDURE — 82565 ASSAY OF CREATININE: CPT

## 2024-06-17 PROCEDURE — 6360000002 HC RX W HCPCS: Performed by: REGISTERED NURSE

## 2024-06-17 PROCEDURE — 36415 COLL VENOUS BLD VENIPUNCTURE: CPT

## 2024-06-17 PROCEDURE — 6360000002 HC RX W HCPCS: Performed by: STUDENT IN AN ORGANIZED HEALTH CARE EDUCATION/TRAINING PROGRAM

## 2024-06-17 PROCEDURE — 80202 ASSAY OF VANCOMYCIN: CPT

## 2024-06-17 PROCEDURE — 93970 EXTREMITY STUDY: CPT

## 2024-06-17 PROCEDURE — 6370000000 HC RX 637 (ALT 250 FOR IP): Performed by: REGISTERED NURSE

## 2024-06-17 PROCEDURE — 99223 1ST HOSP IP/OBS HIGH 75: CPT | Performed by: INTERNAL MEDICINE

## 2024-06-17 PROCEDURE — 94760 N-INVAS EAR/PLS OXIMETRY 1: CPT

## 2024-06-17 PROCEDURE — 6360000002 HC RX W HCPCS: Performed by: INTERNAL MEDICINE

## 2024-06-17 PROCEDURE — 93970 EXTREMITY STUDY: CPT | Performed by: SURGERY

## 2024-06-17 PROCEDURE — 1200000000 HC SEMI PRIVATE

## 2024-06-17 PROCEDURE — 6370000000 HC RX 637 (ALT 250 FOR IP): Performed by: INTERNAL MEDICINE

## 2024-06-17 RX ORDER — ACYCLOVIR 400 MG/1
1 TABLET ORAL
Qty: 1 EACH | Refills: 0 | Status: SHIPPED | OUTPATIENT
Start: 2024-06-17

## 2024-06-17 RX ORDER — LANCETS 30 GAUGE
1 EACH MISCELLANEOUS
Qty: 200 EACH | Refills: 3 | Status: SHIPPED | OUTPATIENT
Start: 2024-06-17

## 2024-06-17 RX ORDER — ACYCLOVIR 400 MG/1
1 TABLET ORAL
Qty: 3 EACH | Refills: 3 | Status: SHIPPED | OUTPATIENT
Start: 2024-06-17

## 2024-06-17 RX ORDER — GLUCOSAMINE HCL/CHONDROITIN SU 500-400 MG
CAPSULE ORAL
Qty: 200 STRIP | Refills: 3 | Status: SHIPPED | OUTPATIENT
Start: 2024-06-17

## 2024-06-17 RX ADMIN — ENOXAPARIN SODIUM 100 MG: 100 INJECTION SUBCUTANEOUS at 21:40

## 2024-06-17 RX ADMIN — INSULIN LISPRO 6 UNITS: 100 INJECTION, SOLUTION INTRAVENOUS; SUBCUTANEOUS at 18:25

## 2024-06-17 RX ADMIN — SODIUM CHLORIDE, PRESERVATIVE FREE 10 ML: 5 INJECTION INTRAVENOUS at 08:00

## 2024-06-17 RX ADMIN — PIPERACILLIN AND TAZOBACTAM 3375 MG: 3; .375 INJECTION, POWDER, LYOPHILIZED, FOR SOLUTION INTRAVENOUS at 05:33

## 2024-06-17 RX ADMIN — PIPERACILLIN AND TAZOBACTAM 3375 MG: 3; .375 INJECTION, POWDER, LYOPHILIZED, FOR SOLUTION INTRAVENOUS at 21:40

## 2024-06-17 RX ADMIN — INSULIN LISPRO 6 UNITS: 100 INJECTION, SOLUTION INTRAVENOUS; SUBCUTANEOUS at 11:46

## 2024-06-17 RX ADMIN — SODIUM CHLORIDE, PRESERVATIVE FREE 10 ML: 5 INJECTION INTRAVENOUS at 21:40

## 2024-06-17 RX ADMIN — PIPERACILLIN AND TAZOBACTAM 3375 MG: 3; .375 INJECTION, POWDER, LYOPHILIZED, FOR SOLUTION INTRAVENOUS at 14:30

## 2024-06-17 RX ADMIN — NIFEDIPINE 30 MG: 30 TABLET, EXTENDED RELEASE ORAL at 07:57

## 2024-06-17 RX ADMIN — HYDRALAZINE HYDROCHLORIDE 10 MG: 20 INJECTION INTRAMUSCULAR; INTRAVENOUS at 05:46

## 2024-06-17 RX ADMIN — VANCOMYCIN 1250 MG: 1.75 INJECTION, SOLUTION INTRAVENOUS at 11:54

## 2024-06-17 RX ADMIN — IBUPROFEN 400 MG: 400 TABLET, FILM COATED ORAL at 10:39

## 2024-06-17 RX ADMIN — ACETAMINOPHEN 325MG 650 MG: 325 TABLET ORAL at 07:56

## 2024-06-17 RX ADMIN — VANCOMYCIN 1250 MG: 1.75 INJECTION, SOLUTION INTRAVENOUS at 02:29

## 2024-06-17 RX ADMIN — ENOXAPARIN SODIUM 100 MG: 100 INJECTION SUBCUTANEOUS at 09:48

## 2024-06-17 RX ADMIN — HYDRALAZINE HYDROCHLORIDE 10 MG: 20 INJECTION INTRAMUSCULAR; INTRAVENOUS at 16:59

## 2024-06-17 RX ADMIN — ONDANSETRON 4 MG: 2 INJECTION INTRAMUSCULAR; INTRAVENOUS at 07:58

## 2024-06-17 RX ADMIN — ACETAMINOPHEN 325MG 650 MG: 325 TABLET ORAL at 17:03

## 2024-06-17 RX ADMIN — INSULIN GLARGINE 20 UNITS: 100 INJECTION, SOLUTION SUBCUTANEOUS at 21:40

## 2024-06-17 ASSESSMENT — PAIN SCALES - GENERAL: PAINLEVEL_OUTOF10: 3

## 2024-06-17 NOTE — PROGRESS NOTES
Clinical Pharmacy Note  Vancomycin Consult    Srini Recio is a 53 y.o. male ordered vancomycin for SSTI; consult received from Dr. Ferreira to manage therapy. Also receiving Zosyn.    Allergies:  Patient has no known allergies.     Temp max:  Temp (24hrs), Av.1 °F (36.7 °C), Min:97.7 °F (36.5 °C), Max:98.3 °F (36.8 °C)      Recent Labs     06/15/24  182   WBC 7.5         Recent Labs     06/15/24  1829 24  0523   BUN 14  --    CREATININE 0.9 1.0           Intake/Output Summary (Last 24 hours) at 2024 1209  Last data filed at 2024 1045  Gross per 24 hour   Intake 1050 ml   Output --   Net 1050 ml       Culture Results:  Blood cultures pending    Ht Readings from Last 1 Encounters:   06/15/24 1.829 m (6')        Wt Readings from Last 1 Encounters:   24 101.7 kg (224 lb 3.3 oz)         Estimated Creatinine Clearance: 103 mL/min (based on SCr of 1 mg/dL).    Assessment/Plan:  Day # 2 of vancomycin.  Hx of DM. CT suggestive of osteo and adjacent small abscess.    Given combination of Zosyn and Vanco will check daily Scr and adjusting based on levels.    Vancomycin 1250 mg IV every 12 hours.    Goal -600  Predicted  (24,ss)    Continue current regimen     Thank you for the consult.   Alix Hernadez [facility]

## 2024-06-17 NOTE — CONSULTS
Infectious Diseases Inpatient Consult Note      Reason for Consult: Right foot osteomyelitis, diabetic foot infection    Requesting Physician: /Best     Primary Care Physician:  Beto Espinoza DO    History Obtained From:  Epic and pt     CHIEF COMPLAINT:     Chief Complaint   Patient presents with    Foot Swelling     R foot swelling since yesterday. Patient previously taking lisinopril, diuretic and metformin, but no longer sees his PCP. Has not taken these medications regularly in 4-5 months. /107. States his weight is up about 20lbs.         HISTORY OF PRESENT ILLNESS:  53 y.o. man with a significant history for diabetes, hypertension admitted to hospital secondary to swelling of the right foot and ankle onset for the past few days did not respond to diuretics due to his worsening pain and swelling present to the hospital for further evaluation there is an open wound on the right foot second toe unfortunately patient has been noncompliant with medication in the past months, labs indicate CRP 29.4 creatinine 0.9 hemoglobin A1c 13.2 white blood count normal hemoglobin 11 ESR 55 blood culture in process, CT right foot with contrast indicating skin defect on the second digit of the right foot with erosion involving the distal phalanx concern for osteomyelitis        Past Medical History:    DM  HTN+     Past Surgical History:    Past Surgical History:   Procedure Laterality Date    HERNIA REPAIR      at birth       Current Medications:    No outpatient medications have been marked as taking for the 6/15/24 encounter (Hospital Encounter).       Allergies:  Patient has no known allergies.    Immunizations :   Immunization History   Administered Date(s) Administered    COVID-19, J&J, (age 18y+), IM, 0.5 mL 03/06/2021    COVID-19, PFIZER GRAY top, DO NOT Dilute, (age 12 y+), IM, 30 mcg/0.3 mL 08/19/2022    COVID-19, PFIZER PURPLE top, DILUTE for use, (age 12 y+), 30mcg/0.3mL 02/03/2022    TDaP, ADACEL         Complicated right diabetic foot infection  Right foot cellulitis  Right second toe osteomyelitis  Diabetes poor control  Hemoglobin A1c greater than 13  Elevated blood glucose  Hypertension  CT of the right foot indicating erosion of the second digit distal phalanx with fluid collection concerning for abscess and osteomyelitis  ESR elevation  CRP elevation        Labs, Microbiology, Radiology and pertinent results from current hospitalization and care every where were reviewed by me as a part of the consultation.    PLAN :  Cont IV Zosyn x 3.375  mg Q 8 HR  D/c IV Vancomycin risk for PAT  Trend esr, crp  Wound cx  Podiatry consult  Rt leg elevation   Check Arterial Duplex to evaluate for PVD given the skin changes and pain   DM control d/w pt   Needs DM education and compliance   Risk for limb loss           Discussed with patient/Family and Nursing     Medical Decision Making:  The following items were considered in medical decision making:  Discussion of patient care with other providers  Reviewed clinical lab tests  Reviewed radiology tests  Reviewed other diagnostic tests/interventions  Independent review of radiologic images  Independent review of  Microbiology cultures and other micro tests reviewed     Risk of Complications/Morbidity: High      Illness(es)/ Infection present that pose threat to bodily function.   There is potential for severe exacerbation of infection/side effects of treatment.  Therapy requires intensive monitoring for antimicrobial agent toxicity.     Thanks for allowing me to participate in your patient's care please call me with any questions or concerns.    Dr. Gloria Mcfarland MD  Infectious Disease  [facility] Physician  Phone: [phone]   Fax : [phone]

## 2024-06-17 NOTE — PROGRESS NOTES
Occupational Therapy Attempt  Srini Recio    OT order noted. Pt moving independently in the room with no therapy needs at this time. Please re-order if there is a change in WB status.    Electronically signed by Arely Geronimo OT on 6/17/24 at 11:51 AM EDT

## 2024-06-17 NOTE — PLAN OF CARE
Problem: Discharge Planning  Goal: Discharge to home or other facility with appropriate resources  6/17/2024 0846 by Peyton Glover, RN  Outcome: Progressing     Problem: Pain  Goal: Verbalizes/displays adequate comfort level or baseline comfort level  6/17/2024 0846 by Peyton Glover, RN  Outcome: Progressing

## 2024-06-17 NOTE — PROGRESS NOTES
V2.0    [facility] [place]      Name:  Srini Recio /Age/Sex: 1970  (53 y.o. male)   MRN & CSN:  [id number] & [id number] Encounter Date/Time: 2024 7:59 AM EDT   Location:  [id number] PCP: Beto Espinoza DO     Attending:Edmar Jewell MD       Hospital Day: 3    Assessment and Recommendations       Srini Recio is a 53 y.o. male with pmh of DM, HTN who presents with Osteomyelitis (HCC)          1. Right foot osteomyelitis   2. Suspected Right LL DVT   3.          Elevated D-dimer  3. Uncontrolled DM   4. Uncontrolled HTN      Plan:  -blood cultures obtained   -Empirical anabiotic (Vancomycin and Zosyn )  -CT Rt foot: Cellulitis with erosions involving the second digit suggestive of osteomyelitis with an adjacent small abscess  -Podiatry consult   Continue therapeutic Lovenox: Venous Doppler of the lower extremities pending  Continue home premedication  Continue current insulin regimen  Consult ID        DVT Prophylaxis: Lovenox.   Code Status: Total Support.   Barrier to DC: IV antibiotics, ID clearance, podiatry clearance          Subjective:     Patient was seen and examined today.  No acute events overnight.  Patient remains afebrile with stable vital signs.    Review of Systems:      Pertinent positives and negatives discussed in HPI    Objective:     Intake/Output Summary (Last 24 hours) at 2024 0759  Last data filed at 2024 193  Gross per 24 hour   Intake 1290 ml   Output --   Net 1290 ml      Vitals:   Vitals:    24 1700 24 1934 24 0536 24 0744   BP: (!) 172/80 (!) 149/88 (!) 170/98 (!) 176/104   Pulse: 90 83 74 91   Resp:    Temp:  98.3 °F (36.8 °C) 98 °F (36.7 °C) 97.7 °F (36.5 °C)   TempSrc:  Oral Oral Oral   SpO2:  97% 98% 98%   Weight:   101.7 kg (224 lb 3.3 oz)    Height:             Physical Exam:      General: awake, alert, in NAD  Eyes: EOMI  ENT: neck supple  Cardiovascular: Regular rate.  Respiratory: Clear to

## 2024-06-17 NOTE — CARE COORDINATION
Case Management Assessment  Initial Evaluation    Date/Time of Evaluation: 6/17/2024 12:14 PM  Assessment Completed by: AUGIE Fraser    If patient is discharged prior to next notation, then this note serves as note for discharge by case management.    Patient Name: Srini Recio                   YOB: 1970  Diagnosis: Osteomyelitis (HCC) [M86.9]  Right ankle swelling [M25.471]  Uncontrolled hypertension [I10]  H/O medication noncompliance [Z91.148]  Elevated d-dimer [R79.89]  Other acute osteomyelitis of right foot (HCC) [M86.171]                   Date / Time: 6/15/2024  5:51 PM    Patient Admission Status: Inpatient   Readmission Risk (Low < 19, Mod (19-27), High > 27): Readmission Risk Score: 4.7    Current PCP: Beto Espinoza, DO  PCP verified by CM? (P) Yes    Chart Reviewed: Yes      History Provided by: (P) Patient  Patient Orientation: (P) Alert and Oriented, Person, Place, Situation    Patient Cognition: (P) Alert    Hospitalization in the last 30 days (Readmission):  No    If yes, Readmission Assessment in  Navigator will be completed.    Advance Directives:      Code Status: Full Code   Patient's Primary Decision Maker is: (P) Legal Next of Kin      Discharge Planning:    Patient lives with: (P) Spouse/Significant Other Type of Home: (P) House  Primary Care Giver: (P) Self  Patient Support Systems include: (P) Spouse/Significant Other   Current Financial resources: (P) None  Current community resources: (P) None  Current services prior to admission: (P) None            Current DME:              Type of Home Care services:  (P) None    ADLS  Prior functional level: (P) Independent in ADLs/IADLs  Current functional level: (P) Independent in ADLs/IADLs    PT AM-PAC:   /24  OT AM-PAC:   /24    Family can provide assistance at DC: (P) Yes  Would you like Case Management to discuss the discharge plan with any other family members/significant others, and if so, who? (P) No  Plans to

## 2024-06-17 NOTE — PROGRESS NOTES
[facility]  Diabetes Education   Progress Note       NAME:  Srini Recio  MEDICAL RECORD NUMBER:  [id number]  AGE: 53 y.o.   GENDER: male  : 1970  TODAY'S DATE:  2024    Subjective   Reason for Diabetes Education Evaluation and Assessment: \"No DM Med's\", DM Education and Support    Visit Type: evaluation      Srini Recio is a 53 y.o. male referred by:  [x] Physician    Met with  pt. Discussed diabetes management, medications and the importance of follow up with providers to assist with management. Pt voiced he has not taken any medications in five months due to inability for his provider to prescribe medications due to inability to be seen in the office.     Attempted to offer demonstration and re demonstration how to use an insulin pen. Pt declined to demonstrate understanding stating he \"doesn't want to take insulin\" Pt has questions about managing his diabetes holistically for \"my body to heal itself\". Pt offered support and understanding that if he has been exercising and watching his intake over the last 5 months with no medications, his diabetes would benefit further medicinal support to lower his glucose.       PAST MEDICAL HISTORY    History reviewed. No pertinent past medical history.    PAST SURGICAL HISTORY    Past Surgical History:   Procedure Laterality Date    HERNIA REPAIR      at birth       FAMILY HISTORY    Family History   Problem Relation Age of Onset    Alcohol Abuse Father        SOCIAL HISTORY    Social History     Tobacco Use    Smoking status: Never    Smokeless tobacco: Never   Substance Use Topics    Alcohol use: Never    Drug use: Never       ALLERGIES    No Known Allergies    MEDICATIONS     NIFEdipine  30 mg Oral Daily    vancomycin  1,250 mg IntraVENous Q12H    vancomycin (VANCOCIN) intermittent dosing (placeholder)   Other RX Placeholder    insulin glargine  20 Units SubCUTAneous Nightly    insulin lispro  6 Units SubCUTAneous TID WC    sodium

## 2024-06-17 NOTE — PLAN OF CARE
Problem: Discharge Planning  Goal: Discharge to home or other facility with appropriate resources  6/16/2024 2244 by Lisette Edwards RN  Outcome: Progressing  6/16/2024 1942 by Aliya Emmanuel, RN  Outcome: Progressing     Problem: Pain  Goal: Verbalizes/displays adequate comfort level or baseline comfort level  6/16/2024 2244 by Lisette Edwards RN  Outcome: Progressing  6/16/2024 1942 by Aliya Emmanuel, RN  Outcome: Progressing

## 2024-06-17 NOTE — DISCHARGE INSTRUCTIONS
American Diabetes Association     About Diabetes: https://diabetes.org/about-diabetes     Life with Diabetes: https://diabetes.org/living-with-diabetes     Health & Wellness: https://diabetes.org/health-wellness     Food & Nutrition: https://diabetes.org/food-nutrition     Tools & Resources: https://diabetes.org/tools-resources        Podiatric Post Operative Instructions:  You have had a surgical procedure on your right foot.      Fluids and Diet:  Begin with clear liquids, broth, dry toast, and crackers.  If not nauseated then resume your regular pre-operative diet when you are ready    Medications:  Take your prescriptions as directed  If your pain is not severe then you may take the non-prescription medication that you normally take for aches and pains  You may resume your regularly scheduled medications (unless otherwise directed)  If any side effects or adverse reactions occur, discontinue the medication and contact your doctor.  Review the patient drug information that is provided before you take any medication    Ambulation and Activity:  You are advised to go directly home from the hospital  Use crutches as needed  You may put weight on the operated foot with focus to the heel of the right foot in the surgical shoe    You should wear the surgical shoe at all times when awake.  Avoid stairs if possible.  Do not lift or move heavy objects  Do not drive until cleared by Dr. Hassan    Bandage and Wound Care Instructions:  Keep bandage clean and dry  Do not shower or bathe the operative extremity  Do not remove the bandage (unless otherwise directed)  Do not attempt to put anything between the cast or dressing and your skin, some itching is normal.    Ice and Elevation:  Elevate operative extremity as much as possible to reduce swelling and discomfort.  Elevate with 2 pillows at or above the level of the heart for the first 72 hours.  Ice:  Apply Hospital dispensed insulated ice bag over the bandage 20 minutes  of every hour while awake for the first 72 hours.  You may behind the knee as well.    Special Instructions: Call your doctor immediately if you develop any of the following.  Fever over 100 degrees by mouth - take your temperature daily until your first follow up visit.  Pain not relieved by medication ordered  Swelling, increased redness, warmth, or hardness around operative area.  Numb, tingling or cold toes.  Toe(s) become white or bluish  Bandage becomes wet, soiled, or blood soaked (small amount of bleeding may be normal)  Increased or progressive drainage from surgical area.    Follow up instructions:  You will need to follow up with Dr. Hassan's office in the next 5 to 7 days.  Call [phone] when you get home to make an appointment.  Call Dr. Hassan's office if you have any questions or concerns.    Dr Beto Hassan DPM  [facility]  Office: [phone]  Fax: [phone]

## 2024-06-17 NOTE — CARE COORDINATION
Wound referral for right 2nd toe ulcer. Pt admitted with osteo. Podiatry consult pending. Defer wound care to podiatry. Les Ford, MSN, RN, CWOCN

## 2024-06-18 ENCOUNTER — ANESTHESIA EVENT (OUTPATIENT)
Dept: OPERATING ROOM | Age: 54
DRG: 617 | End: 2024-06-18
Payer: COMMERCIAL

## 2024-06-18 PROBLEM — L03.119 CELLULITIS AND ABSCESS OF FOOT: Status: ACTIVE | Noted: 2024-06-18

## 2024-06-18 PROBLEM — M79.89 FOOT SWELLING: Status: ACTIVE | Noted: 2024-06-18

## 2024-06-18 PROBLEM — E11.628 TYPE 2 DIABETES MELLITUS WITH DIABETIC FOOT INFECTION (HCC): Status: ACTIVE | Noted: 2024-06-18

## 2024-06-18 PROBLEM — I10 PRIMARY HYPERTENSION: Status: ACTIVE | Noted: 2024-06-18

## 2024-06-18 PROBLEM — L02.619 CELLULITIS AND ABSCESS OF FOOT: Status: ACTIVE | Noted: 2024-06-18

## 2024-06-18 PROBLEM — L08.9 TYPE 2 DIABETES MELLITUS WITH DIABETIC FOOT INFECTION (HCC): Status: ACTIVE | Noted: 2024-06-18

## 2024-06-18 PROBLEM — R69 POORLY CONTROLLED DISEASE: Status: ACTIVE | Noted: 2024-06-18

## 2024-06-18 PROBLEM — Z91.199 MEDICAL NON-COMPLIANCE: Status: ACTIVE | Noted: 2024-06-18

## 2024-06-18 PROBLEM — R79.82 CRP ELEVATED: Status: ACTIVE | Noted: 2024-06-18

## 2024-06-18 LAB
CREAT SERPL-MCNC: 0.9 MG/DL (ref 0.9–1.3)
GFR SERPLBLD CREATININE-BSD FMLA CKD-EPI: >90 ML/MIN/{1.73_M2}
GLUCOSE BLD-MCNC: 104 MG/DL (ref 70–99)
GLUCOSE BLD-MCNC: 120 MG/DL (ref 70–99)
GLUCOSE BLD-MCNC: 127 MG/DL (ref 70–99)
GLUCOSE BLD-MCNC: 237 MG/DL (ref 70–99)
PERFORMED ON: ABNORMAL

## 2024-06-18 PROCEDURE — 6370000000 HC RX 637 (ALT 250 FOR IP): Performed by: INTERNAL MEDICINE

## 2024-06-18 PROCEDURE — 2580000003 HC RX 258: Performed by: INTERNAL MEDICINE

## 2024-06-18 PROCEDURE — 82565 ASSAY OF CREATININE: CPT

## 2024-06-18 PROCEDURE — 6360000002 HC RX W HCPCS: Performed by: INTERNAL MEDICINE

## 2024-06-18 PROCEDURE — 6370000000 HC RX 637 (ALT 250 FOR IP): Performed by: STUDENT IN AN ORGANIZED HEALTH CARE EDUCATION/TRAINING PROGRAM

## 2024-06-18 PROCEDURE — 36415 COLL VENOUS BLD VENIPUNCTURE: CPT

## 2024-06-18 PROCEDURE — 99233 SBSQ HOSP IP/OBS HIGH 50: CPT | Performed by: INTERNAL MEDICINE

## 2024-06-18 PROCEDURE — 94760 N-INVAS EAR/PLS OXIMETRY 1: CPT

## 2024-06-18 PROCEDURE — 1200000000 HC SEMI PRIVATE

## 2024-06-18 RX ORDER — ENOXAPARIN SODIUM 100 MG/ML
40 INJECTION SUBCUTANEOUS DAILY
Status: DISCONTINUED | OUTPATIENT
Start: 2024-06-19 | End: 2024-06-20 | Stop reason: HOSPADM

## 2024-06-18 RX ADMIN — INSULIN LISPRO 6 UNITS: 100 INJECTION, SOLUTION INTRAVENOUS; SUBCUTANEOUS at 17:40

## 2024-06-18 RX ADMIN — PIPERACILLIN AND TAZOBACTAM 3375 MG: 3; .375 INJECTION, POWDER, LYOPHILIZED, FOR SOLUTION INTRAVENOUS at 21:28

## 2024-06-18 RX ADMIN — INSULIN LISPRO 6 UNITS: 100 INJECTION, SOLUTION INTRAVENOUS; SUBCUTANEOUS at 08:21

## 2024-06-18 RX ADMIN — PIPERACILLIN AND TAZOBACTAM 3375 MG: 3; .375 INJECTION, POWDER, LYOPHILIZED, FOR SOLUTION INTRAVENOUS at 14:23

## 2024-06-18 RX ADMIN — ENOXAPARIN SODIUM 100 MG: 100 INJECTION SUBCUTANEOUS at 08:21

## 2024-06-18 RX ADMIN — NIFEDIPINE 30 MG: 30 TABLET, EXTENDED RELEASE ORAL at 08:21

## 2024-06-18 RX ADMIN — INSULIN LISPRO 6 UNITS: 100 INJECTION, SOLUTION INTRAVENOUS; SUBCUTANEOUS at 12:27

## 2024-06-18 RX ADMIN — INSULIN GLARGINE 20 UNITS: 100 INJECTION, SOLUTION SUBCUTANEOUS at 21:29

## 2024-06-18 RX ADMIN — INSULIN LISPRO 2 UNITS: 100 INJECTION, SOLUTION INTRAVENOUS; SUBCUTANEOUS at 12:27

## 2024-06-18 RX ADMIN — PIPERACILLIN AND TAZOBACTAM 3375 MG: 3; .375 INJECTION, POWDER, LYOPHILIZED, FOR SOLUTION INTRAVENOUS at 05:20

## 2024-06-18 ASSESSMENT — PAIN SCALES - GENERAL: PAINLEVEL_OUTOF10: 0

## 2024-06-18 NOTE — PROGRESS NOTES
V2.0    [facility] [place] Progress Note      Name:  Srini Recio /Age/Sex: 1970  (53 y.o. male)   MRN & CSN:  [id number] & [id number] Encounter Date/Time: 2024 7:59 AM EDT   Location:  [id number] PCP: Beto Espinoza DO     Attending:Edmar Jewell MD       Hospital Day: 4    Assessment and Recommendations       Srini Recio is a 53 y.o. male with pmh of DM, HTN who presents with Osteomyelitis (HCC)          1. Right foot osteomyelitis   2. Suspected Right LL DVT: Ruled out  3.          Elevated D-dimer  3. Uncontrolled DM   4. Uncontrolled HTN      Plan:  OR tomorrow at 1 PM  for amputation of distal phalanx RIGHT 2nd digit with podiatry  Arterial duplex pending  -Empirical anabiotic (Vancomycin and Zosyn )  -CT Rt foot: Cellulitis with erosions involving the second digit suggestive of osteomyelitis with an adjacent small abscess  Please continue therapeutic Lovenox: Venous Doppler of the lower extremities negative for DVTs  Continue home blood pressure medication  Continue current insulin regimen  ID following        DVT Prophylaxis: Lovenox.   Code Status: Total Support.   Barrier to DC: IV antibiotics, ID clearance, podiatry clearance          Subjective:     Patient was seen and examined today.  No acute events overnight.  Patient remains afebrile with stable vital signs.    Review of Systems:      Pertinent positives and negatives discussed in HPI    Objective:     Intake/Output Summary (Last 24 hours) at 2024 1228  Last data filed at 2024 0334  Gross per 24 hour   Intake 340 ml   Output 0 ml   Net 340 ml        Vitals:   Vitals:    24 1008 24 1123 24 1127 24 1129   BP: (!) 153/86 (!) 178/122 (!) 159/97 (!) 161/90   Pulse: 95 85 82 83   Resp:  16     Temp:  98.2 °F (36.8 °C)     TempSrc:       SpO2:  98%     Weight:       Height:             Physical Exam:      General: awake, alert, in NAD  Eyes: EOMI  ENT: neck supple  Cardiovascular: Regular rate.  Respiratory:  osteomyelitis Additional Contrast?->1 Reason for Exam: Evaluation of osteomyelitis Relevant Medical/Surgical History: none FINDINGS: Bones: No evidence of acute fracture or dislocation.  Plantar calcaneal enthesophyte. Soft Tissue: Skin defect overlying the 2nd digit distally with adjacent skin thickening and edema.  Associated erosions involving the 2nd digit distal phalanx with an adjacent small rim enhancing fluid collection measuring 0.9 x 0.8 x 0.4 cm.  Diffuse edema and skin thickening throughout the foot. Joint: Moderate to advanced 1st MTP and 1st digit interphalangeal joint osteoarthritis.  Mild tibiotalar and talonavicular joint osteoarthritis. No acute osseous abnormality of the visualized left foot.     Skin defect overlying the 2nd digit distally with adjacent skin thickening and edema suggestive of cellulitis. Associated erosions involving the 2nd digit distal phalanx suggestive of osteomyelitis with an adjacent small abscess. Diffuse edema and skin thickening throughout the foot suggestive of cellulitis versus fluid overload. Moderate to advanced 1st MTP and 1st digit interphalangeal joint osteoarthritis.     XR FOOT RIGHT (MIN 3 VIEWS)    Result Date: 6/15/2024  EXAMINATION: THREE XRAY VIEWS OF THE RIGHT ANKLE; 3 XRAY VIEWS OF THE RIGHT FOOT 6/15/2024 6:21 pm COMPARISON: None. HISTORY: ORDERING SYSTEM PROVIDED HISTORY: swelling, pain TECHNOLOGIST PROVIDED HISTORY: Reason for exam:->swelling, pain Reason for Exam: dm, infection, swelling; ORDERING SYSTEM PROVIDED HISTORY: dm, infection, swelling TECHNOLOGIST PROVIDED HISTORY: Reason for exam:->dm, infection, swelling Reason for Exam: dm, infection, swelling FINDINGS: The ankle mortise is intact.  The tibial plafond and talar dome are unremarkable.  Nonspecific periosteal thickening along the medial malleolus. The subtalar joint is intact.  Small plantar calcaneal enthesophyte.  Normal midfoot alignment is maintained.  Mild 1st metatarsophalangeal

## 2024-06-18 NOTE — CARE COORDINATION
Chart review done, rounds completed. Podiatry recommends patient continue on IV antibiotics per ID: Zosyn. Patient is scheduled for partial amputation of the second digit on the right foot tomorrow with Dr. Beto Hassan at 1 PM.   Pending ID, Podiatry clearance.   Likely 1-2 days.     Pt is from home with wife, likely return home no needs if can heel WB. Watching IVABs.     Kofi Castellanos, SNOW, [facility] Social Work Case Management   Phone: [phone]  Fax: [phone]

## 2024-06-18 NOTE — PROGRESS NOTES
[facility]  Hyperglycemia Event      NAME: Srini Recio  MEDICAL RECORD NUMBER:  [id number]  AGE: 53 y.o.   GENDER: male  : 1970  EPISODE DATE:  2024     Data     Recent Labs     24  0725 24  1132 24  1620 24  2137 24  0749   POCGLU 232* 120* 199* 89 166* 120*       HgBA1c:    Lab Results   Component Value Date/Time    LABA1C 13.2 06/15/2024 07:38 PM       BUN/Creatinine:    Lab Results   Component Value Date/Time    BUN 14 06/15/2024 06:29 PM    CREATININE 0.9 2024 05:10 AM       Medications  Scheduled Medications:   NIFEdipine  30 mg Oral Daily    insulin glargine  20 Units SubCUTAneous Nightly    insulin lispro  6 Units SubCUTAneous TID WC    sodium chloride flush  5-40 mL IntraVENous 2 times per day    insulin lispro  0-8 Units SubCUTAneous TID WC    insulin lispro  0-4 Units SubCUTAneous Nightly    enoxaparin  1 mg/kg SubCUTAneous BID    piperacillin-tazobactam  3,375 mg IntraVENous Q8H       Diet  Current diet/supplement order: ADULT DIET; Regular; Low Fat/Low Chol/High Fiber/2 gm Na  Diet NPO     Recorded PO: PO Meals Eaten (%): 1 - 25% last meal in flowsheets      Action      Glucose Target: 140-180, avoid hypoglycemia    Pt denies any questions or concerns at this time. Outreach call to [facility] for Dexcom CGM sensor /cost: $0.00- notified pt    Recommendation: Continue current regimen   Basal weight-based dosin.4 units, Prandial weight-based dosin.4 units TID with meals)     Electronically signed by Marga Eckert RN on 2024 at 11:56 AM

## 2024-06-18 NOTE — PROGRESS NOTES
Podiatric Surgery Daily Progress Note  Srini Recio      Subjective :   Patient seen and examined this am at the bedside. Patient denies any acute overnight events. Patient denies N/V/F/C/SOB. Patient denies calf pain, thigh pain, chest pain.     Review of Systems: A 12 point review of symptoms is unremarkable with the exception of the chief complaint. Patient specifically denies nausea, fever, vomiting, chills, shortness of breath, chest pain, abdominal pain, constipation or difficulty urinating.       Objective     BP (!) 161/90   Pulse 83   Temp 98.2 °F (36.8 °C)   Resp 16   Ht 1.829 m (6')   Wt 101.7 kg (224 lb 3.3 oz)   SpO2 98%   BMI 30.41 kg/m²      I/O:  Intake/Output Summary (Last 24 hours) at 6/18/2024 1254  Last data filed at 6/18/2024 0334  Gross per 24 hour   Intake 340 ml   Output 0 ml   Net 340 ml              Wt Readings from Last 3 Encounters:   06/17/24 101.7 kg (224 lb 3.3 oz)   04/08/19 93 kg (205 lb)       LABS:    Recent Labs     06/15/24  1829   WBC 7.5   HGB 11.0*   HCT 35.6*           Recent Labs     06/15/24  1829 06/17/24  0523 06/18/24  0510   *  --   --    K 4.3  --   --      --   --    CO2 24  --   --    BUN 14  --   --    CREATININE 0.9   < > 0.9    < > = values in this interval not displayed.      No results for input(s): \"PROT\", \"INR\", \"APTT\" in the last 72 hours.        LOWER EXTREMITY EXAMINATION    Dressing to right LE intact. No strikethrough noted to the external dressing. Minimal drainage noted to the internal layers of the dressing.     VASCULAR: DP and PT pulses are palpable 2/4. CFT is brisk to the digits of the foot b/l. Skin temperature is warm to cool from proximal to distal with no focal calor noted. Moderate non-pitting edema noted to the right forefoot. No pain with calf compression b/l.    NEUROLOGIC: Gross and epicritic sensation is diminished b/l. Protective sensation is diminished at all pedal sites b/l.    DERMATOLOGIC:   Right lower  neuropathy    -Patient examined and evaluated at bedside   -Hypertensive otherwise VSS, no leukocytosis noted (no AM CBC, last WBC 7.5)  -CRP 29.4, ESR 55  -HbA1c 13.2  -Wound culture not obtained at this time 2/2 no active drainage  -Blood culture NGTD  -Imaging reviewed, impression noted above.  -Dressing applied to right LE consisting of Betadine soaked gauze, dry gauze, Kerlix, and Ace  -Recommend patient continue on IV antibiotics per ID: Zosyn  -Weightbearing as tolerated to the right lower extremity, following surgery patient will be heel weightbearing in a surgical shoe    DISPO: Full-thickness ulceration, right lower extremity (Yuan 3).  All labs and imaging reviewed, impressions noted above.  Recommend patient continue on IV antibiotics per ID: Zosyn.  Patient is scheduled for partial amputation of the second digit on the right foot tomorrow with Dr. Beto Hassan at 1 PM.    Discussed assessment and plan with Dr. Beto Hassan DPM.    Cesar Umana DPM   Podiatric Resident PGY1  Pager [phone] or PerfectServe  6/18/2024, 1:02 PM

## 2024-06-18 NOTE — PLAN OF CARE
Problem: Discharge Planning  Goal: Discharge to home or other facility with appropriate resources  6/18/2024 0818 by Peyton Glover RN  Outcome: Progressing     Problem: Pain  Goal: Verbalizes/displays adequate comfort level or baseline comfort level  6/18/2024 0818 by Peyton Glover, RN  Outcome: Progressing     Problem: Chronic Conditions and Co-morbidities  Goal: Patient's chronic conditions and co-morbidity symptoms are monitored and maintained or improved  6/18/2024 0818 by Peyton Glover, RN  Outcome: Progressing     Problem: Safety - Adult  Goal: Free from fall injury  6/18/2024 0818 by Peyton Glover, RN  Outcome: Progressing

## 2024-06-18 NOTE — PLAN OF CARE
Problem: Discharge Planning  Goal: Discharge to home or other facility with appropriate resources  Outcome: Progressing  Flowsheets (Taken 6/17/2024 2000)  Discharge to home or other facility with appropriate resources: Identify barriers to discharge with patient and caregiver     Problem: Pain  Goal: Verbalizes/displays adequate comfort level or baseline comfort level  Outcome: Progressing     Problem: Chronic Conditions and Co-morbidities  Goal: Patient's chronic conditions and co-morbidity symptoms are monitored and maintained or improved  Outcome: Progressing  Flowsheets (Taken 6/17/2024 2000)  Care Plan - Patient's Chronic Conditions and Co-Morbidity Symptoms are Monitored and Maintained or Improved: Monitor and assess patient's chronic conditions and comorbid symptoms for stability, deterioration, or improvement     Problem: Safety - Adult  Goal: Free from fall injury  Outcome: Progressing

## 2024-06-18 NOTE — PROGRESS NOTES
chronic venous stasis.         Vascular duplex lower extremity arteries bilateral    (Results Pending)       All pertinent images and reports for the current Hospitalization were reviewed by me.    IMPRESSION:    Patient Active Problem List   Diagnosis Code    Osteomyelitis ([facility]) M86.9    Type 2 diabetes mellitus with diabetic foot infection ([facility]) E11.628, L08.9    Cellulitis and abscess of foot L03.119, L02.619    Poorly controlled disease R69    Primary hypertension I10    Medical non-compliance Z91.199    CRP elevated R79.82    Foot swelling M79.89        ICD-10-CM    1. Other acute osteomyelitis of right foot ([facility])  M86.171 Vascular duplex lower extremity arteries bilateral     Vascular duplex lower extremity arteries bilateral      2. Uncontrolled hypertension  I10       3. Right ankle swelling  M25.471 Vascular duplex lower extremity venous bilateral     Vascular duplex lower extremity venous bilateral      4. Elevated d-dimer  R79.89       5. H/O medication noncompliance  Z91.148       6. Type 2 diabetes mellitus with hyperglycemia, unspecified whether long term insulin use ([facility])  E11.65 blood glucose monitor kit and supplies     Lancets MISC     blood glucose monitor strips     Continuous Glucose Sensor (DEXCOM G7 SENSOR) MISC     Continuous Glucose  (DEXCOM G7 ) [facility] Medication [facility] (Diabetes - Clinical Pharmacy) - [place]           Complicated right diabetic foot infection  Right foot cellulitis  Right second toe osteomyelitis  Diabetes poor control  Hemoglobin A1c greater than 13  Elevated blood glucose  Hypertension  CT of the right foot indicating erosion of the second digit distal phalanx with fluid collection concerning for abscess and osteomyelitis  ESR elevation  CRP elevation    Podiatry note reviewed May be going for surgery of the right second toe amputation arterial duplex scan pending if margins are clean home on oral antibiotics      Labs, Microbiology, Radiology  and pertinent results from current hospitalization and care every where were reviewed by me as a part of the consultation.    PLAN :  Cont IV Zosyn x 3.375  mg Q 8 HR  Trend esr, crp  Wound cx requested   Podiatry consult  Rt leg elevation   Check Arterial Duplex to evaluate for PVD given the skin changes and pain   DM control d/w pt   Needs DM education and compliance   Risk for limb loss       Home on oral Augmentin x 875 mg q 12 hr x 14 days at d/c       Discussed with patient/Family and Nursing     Medical Decision Making:  The following items were considered in medical decision making:  Discussion of patient care with other providers  Reviewed clinical lab tests  Reviewed radiology tests  Reviewed other diagnostic tests/interventions  Independent review of radiologic images  Independent review of  Microbiology cultures and other micro tests reviewed     Risk of Complications/Morbidity: High      Illness(es)/ Infection present that pose threat to bodily function.   There is potential for severe exacerbation of infection/side effects of treatment.  Therapy requires intensive monitoring for antimicrobial agent toxicity.     Thanks for allowing me to participate in your patient's care please call me with any questions or concerns.    Dr. Gloria Mcfarland MD  Infectious Disease  [facility] Physician  Phone: [phone]   Fax : [phone]

## 2024-06-18 NOTE — CONSULTS
Department of Podiatry Consult Note  Beto Hassan DPM Attending       Reason for Consult:  RIGHT 2nd toe wound with suspected bone infection  Requesting Physician:  Edmar Jewell MD    CHIEF COMPLAINT:  RIGHT 2nd toe wound of several weeks duration. Patient relates he walks several miles a day at [facility] and his toe rubbed in his protective shoegear    HISTORY OF PRESENT ILLNESS:                The patient is a 53 y.o. male with significant past medical history of Type 2 Diabetes with neuropathy who is consulted for a several week history of wound to RIGHT 2nd toe due to his diabetic neuropathy. 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 The swelling and smell of the wound were of concern for him to seek care    Past Medical History:        Diagnosis Date    Type 2 diabetes mellitus (HCC)      Past Surgical History:        Procedure Laterality Date    HERNIA REPAIR      at birth     Current Medications:    Current Facility-Administered Medications: NIFEdipine (PROCARDIA XL) extended release tablet 30 mg, 30 mg, Oral, Daily  ibuprofen (ADVIL;MOTRIN) tablet 400 mg, 400 mg, Oral, Q6H PRN  glucose chewable tablet 16 g, 4 tablet, Oral, PRN  dextrose bolus 10% 125 mL, 125 mL, IntraVENous, PRN **OR** dextrose bolus 10% 250 mL, 250 mL, IntraVENous, PRN  glucagon injection 1 mg, 1 mg, SubCUTAneous, PRN  dextrose 10 % infusion, , IntraVENous, Continuous PRN  insulin glargine (LANTUS) injection vial 20 Units, 20 Units, SubCUTAneous, Nightly  insulin lispro (HUMALOG,ADMELOG) injection vial 6 Units, 6 Units, SubCUTAneous, TID WC  sodium chloride flush 0.9 % injection 5-40 mL, 5-40 mL, IntraVENous, 2 times per day  sodium chloride flush 0.9 % injection 5-40 mL, 5-40 mL, IntraVENous, PRN  0.9 % sodium chloride infusion, , IntraVENous, PRN  potassium chloride (KLOR-CON M) extended release tablet 40 mEq, 40 mEq, Oral, PRN **OR** potassium bicarb-citric acid (EFFER-K)  Ht 1.829 m (6')   Wt 101.7 kg (224 lb 3.3 oz)   SpO2 95%   BMI 30.41 kg/m²     LABS:   Recent Labs     06/15/24  1829   WBC 7.5   HGB 11.0*   HCT 35.6*        Recent Labs     06/15/24  1829 06/17/24  0523 06/18/24  0510   *  --   --    K 4.3  --   --      --   --    CO2 24  --   --    BUN 14  --   --    CREATININE 0.9   < > 0.9    < > = values in this interval not displayed.     No results for input(s): \"PROT\", \"INR\", \"APTT\" in the last 72 hours.    LOWER EXTREMITY EXAMINATION   SKIN:    Distal RIGHT 2nd toe with edema of twice normal size with hyperkeratotic ulcer to distal tuft that probes deeply. Odor of RIGHT foot from wound of second digit without current drainage, but suspected osteomyelitis due to calor and edema    NEUROLOGIC:    Pain sensation isnot significantly changed Bilateral.  Light touch is decreasedRight. positive history of paresthesia Right. negativehistory of burning Bilateral. Deep tendon reflexes are 2 to include patellar and achilles Bilateral. [place] 5.07 monofilament sensitivity is abnormal 1 to 5 spots tested Right.    VASCULAR:    bilaterally Dorsalis pedis is 2. bilaterally Posterior tibial pulse is 2. 1+ edema right. none Varicosities bilaterally.    MUSCULOSKELETAL:  [place] is normal. Muscle strength is 5/5 to all groups tested to include dorsiflexion, plantarflexion, inversion, eversion, and digital Bilateral . The ranges of motion of all joints tested from ankle distal is decreased there is no crepitus noted Right.    Radiographs and CT imaging RIGHT foot:  MPRESSION:  1. Diffuse soft tissue swelling with a soft tissue defect of the distal  aspect of the 2nd toe. Suspected erosion of the tuft of the 2nd distal  phalanx concerning for osteomyelitis.  Consider further evaluation with CT or  MRI.      IMPRESSION/RECOMMENDATIONS    1. Osteomyelitis of RIGHT 2nd digit, distal tuft, due to open wound of toe  +Plan is for O.R. at 1pm tomorrow for amputation of

## 2024-06-19 ENCOUNTER — APPOINTMENT (OUTPATIENT)
Dept: VASCULAR LAB | Age: 54
DRG: 617 | End: 2024-06-19
Attending: INTERNAL MEDICINE
Payer: COMMERCIAL

## 2024-06-19 ENCOUNTER — ANESTHESIA (OUTPATIENT)
Dept: OPERATING ROOM | Age: 54
DRG: 617 | End: 2024-06-19
Payer: COMMERCIAL

## 2024-06-19 ENCOUNTER — APPOINTMENT (OUTPATIENT)
Dept: GENERAL RADIOLOGY | Age: 54
DRG: 617 | End: 2024-06-19
Payer: COMMERCIAL

## 2024-06-19 LAB
BACTERIA BLD CULT ORG #2: NORMAL
BACTERIA BLD CULT: NORMAL
CREAT SERPL-MCNC: 1.2 MG/DL (ref 0.9–1.3)
GFR SERPLBLD CREATININE-BSD FMLA CKD-EPI: 71 ML/MIN/{1.73_M2}
GLUCOSE BLD-MCNC: 121 MG/DL (ref 70–99)
GLUCOSE BLD-MCNC: 131 MG/DL (ref 70–99)
GLUCOSE BLD-MCNC: 132 MG/DL (ref 70–99)
GLUCOSE BLD-MCNC: 159 MG/DL (ref 70–99)
GLUCOSE BLD-MCNC: 258 MG/DL (ref 70–99)
PERFORMED ON: ABNORMAL
VAS LEFT ARM BP: 196 MMHG
VAS LEFT ATA MID PSV: 53.7 CM/S
VAS LEFT CFA DIST PSV: 66.5 CM/S
VAS LEFT CFA PROX PSV: 64.8 CM/S
VAS LEFT PERONEAL MID PSV: 39.5 CM/S
VAS LEFT PFA PROX PSV: 72 CM/S
VAS LEFT POP A DIST PSV: 71.7 CM/S
VAS LEFT POP A PROX PSV: 57.5 CM/S
VAS LEFT POP A PROX VEL RATIO: 1.02
VAS LEFT PTA MID PSV: 67.9 CM/S
VAS LEFT SFA DIST PSV: 56.6 CM/S
VAS LEFT SFA DIST VEL RATIO: 0.92
VAS LEFT SFA MID PSV: 61.3 CM/S
VAS LEFT SFA MID VEL RATIO: 0.79
VAS LEFT SFA PROX PSV: 77.7 CM/S
VAS LEFT SFA PROX VEL RATIO: 1.17
VAS RIGHT ARM BP: 200 MMHG
VAS RIGHT ATA MID PSV: 44.7 CM/S
VAS RIGHT CFA DIST PSV: 86 CM/S
VAS RIGHT CFA PROX PSV: 77.2 CM/S
VAS RIGHT PERONEAL MID PSV: 40.9 CM/S
VAS RIGHT PFA PROX PSV: 58 CM/S
VAS RIGHT POP A DIST PSV: 68.4 CM/S
VAS RIGHT POP A PROX PSV: 61.8 CM/S
VAS RIGHT POP A PROX VEL RATIO: 0.86
VAS RIGHT PTA MID PSV: 68.4 CM/S
VAS RIGHT SFA DIST PSV: 71.7 CM/S
VAS RIGHT SFA DIST VEL RATIO: 0.81
VAS RIGHT SFA MID PSV: 88.2 CM/S
VAS RIGHT SFA MID VEL RATIO: 1.1
VAS RIGHT SFA PROX PSV: 81.6 CM/S
VAS RIGHT SFA PROX VEL RATIO: 0.9

## 2024-06-19 PROCEDURE — 1200000000 HC SEMI PRIVATE

## 2024-06-19 PROCEDURE — 7100000001 HC PACU RECOVERY - ADDTL 15 MIN: Performed by: PODIATRIST

## 2024-06-19 PROCEDURE — 3700000001 HC ADD 15 MINUTES (ANESTHESIA): Performed by: PODIATRIST

## 2024-06-19 PROCEDURE — 93925 LOWER EXTREMITY STUDY: CPT | Performed by: SURGERY

## 2024-06-19 PROCEDURE — 73630 X-RAY EXAM OF FOOT: CPT

## 2024-06-19 PROCEDURE — 93925 LOWER EXTREMITY STUDY: CPT

## 2024-06-19 PROCEDURE — 94150 VITAL CAPACITY TEST: CPT

## 2024-06-19 PROCEDURE — 2580000003 HC RX 258

## 2024-06-19 PROCEDURE — 36415 COLL VENOUS BLD VENIPUNCTURE: CPT

## 2024-06-19 PROCEDURE — 2580000003 HC RX 258: Performed by: PODIATRIST

## 2024-06-19 PROCEDURE — 6360000002 HC RX W HCPCS

## 2024-06-19 PROCEDURE — 6360000002 HC RX W HCPCS: Performed by: PODIATRIST

## 2024-06-19 PROCEDURE — 2500000003 HC RX 250 WO HCPCS

## 2024-06-19 PROCEDURE — 2500000003 HC RX 250 WO HCPCS: Performed by: INTERNAL MEDICINE

## 2024-06-19 PROCEDURE — 2709999900 HC NON-CHARGEABLE SUPPLY: Performed by: PODIATRIST

## 2024-06-19 PROCEDURE — 3700000000 HC ANESTHESIA ATTENDED CARE: Performed by: PODIATRIST

## 2024-06-19 PROCEDURE — 6360000002 HC RX W HCPCS: Performed by: INTERNAL MEDICINE

## 2024-06-19 PROCEDURE — 88305 TISSUE EXAM BY PATHOLOGIST: CPT

## 2024-06-19 PROCEDURE — 82565 ASSAY OF CREATININE: CPT

## 2024-06-19 PROCEDURE — 88311 DECALCIFY TISSUE: CPT

## 2024-06-19 PROCEDURE — 2580000003 HC RX 258: Performed by: INTERNAL MEDICINE

## 2024-06-19 PROCEDURE — 6360000002 HC RX W HCPCS: Performed by: REGISTERED NURSE

## 2024-06-19 PROCEDURE — 3600000004 HC SURGERY LEVEL 4 BASE: Performed by: PODIATRIST

## 2024-06-19 PROCEDURE — 3600000014 HC SURGERY LEVEL 4 ADDTL 15MIN: Performed by: PODIATRIST

## 2024-06-19 PROCEDURE — 6370000000 HC RX 637 (ALT 250 FOR IP)

## 2024-06-19 PROCEDURE — 7100000000 HC PACU RECOVERY - FIRST 15 MIN: Performed by: PODIATRIST

## 2024-06-19 PROCEDURE — 6370000000 HC RX 637 (ALT 250 FOR IP): Performed by: INTERNAL MEDICINE

## 2024-06-19 PROCEDURE — 94760 N-INVAS EAR/PLS OXIMETRY 1: CPT

## 2024-06-19 PROCEDURE — 0Y6R0Z3 DETACHMENT AT RIGHT 2ND TOE, LOW, OPEN APPROACH: ICD-10-PCS

## 2024-06-19 PROCEDURE — A4217 STERILE WATER/SALINE, 500 ML: HCPCS | Performed by: PODIATRIST

## 2024-06-19 RX ORDER — SODIUM CHLORIDE 0.9 % (FLUSH) 0.9 %
5-40 SYRINGE (ML) INJECTION EVERY 12 HOURS SCHEDULED
Status: DISCONTINUED | OUTPATIENT
Start: 2024-06-19 | End: 2024-06-19

## 2024-06-19 RX ORDER — LIDOCAINE HYDROCHLORIDE 20 MG/ML
INJECTION, SOLUTION EPIDURAL; INFILTRATION; INTRACAUDAL; PERINEURAL PRN
Status: DISCONTINUED | OUTPATIENT
Start: 2024-06-19 | End: 2024-06-19 | Stop reason: SDUPTHER

## 2024-06-19 RX ORDER — OXYCODONE HYDROCHLORIDE 10 MG/1
10 TABLET ORAL PRN
Status: DISCONTINUED | OUTPATIENT
Start: 2024-06-19 | End: 2024-06-19

## 2024-06-19 RX ORDER — SODIUM CHLORIDE 0.9 % (FLUSH) 0.9 %
5-40 SYRINGE (ML) INJECTION PRN
Status: DISCONTINUED | OUTPATIENT
Start: 2024-06-19 | End: 2024-06-19

## 2024-06-19 RX ORDER — FENTANYL CITRATE 0.05 MG/ML
50 INJECTION, SOLUTION INTRAMUSCULAR; INTRAVENOUS EVERY 5 MIN PRN
Status: DISCONTINUED | OUTPATIENT
Start: 2024-06-19 | End: 2024-06-19

## 2024-06-19 RX ORDER — SODIUM CHLORIDE 9 MG/ML
INJECTION, SOLUTION INTRAVENOUS PRN
Status: DISCONTINUED | OUTPATIENT
Start: 2024-06-19 | End: 2024-06-19 | Stop reason: HOSPADM

## 2024-06-19 RX ORDER — NALOXONE HYDROCHLORIDE 0.4 MG/ML
INJECTION, SOLUTION INTRAMUSCULAR; INTRAVENOUS; SUBCUTANEOUS PRN
Status: DISCONTINUED | OUTPATIENT
Start: 2024-06-19 | End: 2024-06-19

## 2024-06-19 RX ORDER — HYDRALAZINE HYDROCHLORIDE 20 MG/ML
10 INJECTION INTRAMUSCULAR; INTRAVENOUS
Status: DISCONTINUED | OUTPATIENT
Start: 2024-06-19 | End: 2024-06-19

## 2024-06-19 RX ORDER — SODIUM CHLORIDE 9 MG/ML
INJECTION, SOLUTION INTRAVENOUS PRN
Status: DISCONTINUED | OUTPATIENT
Start: 2024-06-19 | End: 2024-06-19

## 2024-06-19 RX ORDER — PROPOFOL 10 MG/ML
INJECTION, EMULSION INTRAVENOUS PRN
Status: DISCONTINUED | OUTPATIENT
Start: 2024-06-19 | End: 2024-06-19 | Stop reason: SDUPTHER

## 2024-06-19 RX ORDER — FENTANYL CITRATE 50 UG/ML
INJECTION, SOLUTION INTRAMUSCULAR; INTRAVENOUS PRN
Status: DISCONTINUED | OUTPATIENT
Start: 2024-06-19 | End: 2024-06-19 | Stop reason: SDUPTHER

## 2024-06-19 RX ORDER — ACETAMINOPHEN 325 MG/1
650 TABLET ORAL
Status: DISCONTINUED | OUTPATIENT
Start: 2024-06-19 | End: 2024-06-19

## 2024-06-19 RX ORDER — ONDANSETRON 2 MG/ML
4 INJECTION INTRAMUSCULAR; INTRAVENOUS
Status: DISCONTINUED | OUTPATIENT
Start: 2024-06-19 | End: 2024-06-19

## 2024-06-19 RX ORDER — SODIUM CHLORIDE 9 MG/ML
INJECTION, SOLUTION INTRAVENOUS CONTINUOUS PRN
Status: DISCONTINUED | OUTPATIENT
Start: 2024-06-19 | End: 2024-06-19 | Stop reason: SDUPTHER

## 2024-06-19 RX ORDER — METOPROLOL TARTRATE 1 MG/ML
5 INJECTION, SOLUTION INTRAVENOUS EVERY 6 HOURS PRN
Status: DISCONTINUED | OUTPATIENT
Start: 2024-06-19 | End: 2024-06-20 | Stop reason: HOSPADM

## 2024-06-19 RX ORDER — OXYCODONE HYDROCHLORIDE 5 MG/1
5 TABLET ORAL PRN
Status: DISCONTINUED | OUTPATIENT
Start: 2024-06-19 | End: 2024-06-19

## 2024-06-19 RX ORDER — SODIUM CHLORIDE 0.9 % (FLUSH) 0.9 %
5-40 SYRINGE (ML) INJECTION PRN
Status: DISCONTINUED | OUTPATIENT
Start: 2024-06-19 | End: 2024-06-19 | Stop reason: HOSPADM

## 2024-06-19 RX ORDER — SODIUM CHLORIDE 0.9 % (FLUSH) 0.9 %
5-40 SYRINGE (ML) INJECTION EVERY 12 HOURS SCHEDULED
Status: DISCONTINUED | OUTPATIENT
Start: 2024-06-19 | End: 2024-06-19 | Stop reason: HOSPADM

## 2024-06-19 RX ORDER — MAGNESIUM HYDROXIDE 1200 MG/15ML
LIQUID ORAL CONTINUOUS PRN
Status: COMPLETED | OUTPATIENT
Start: 2024-06-19 | End: 2024-06-19

## 2024-06-19 RX ADMIN — LIDOCAINE HYDROCHLORIDE 50 MG: 20 INJECTION, SOLUTION EPIDURAL; INFILTRATION; INTRACAUDAL; PERINEURAL at 13:19

## 2024-06-19 RX ADMIN — SODIUM CHLORIDE, PRESERVATIVE FREE 10 ML: 5 INJECTION INTRAVENOUS at 20:36

## 2024-06-19 RX ADMIN — NIFEDIPINE 30 MG: 30 TABLET, EXTENDED RELEASE ORAL at 08:41

## 2024-06-19 RX ADMIN — FENTANYL CITRATE 50 MCG: 50 INJECTION INTRAMUSCULAR; INTRAVENOUS at 13:13

## 2024-06-19 RX ADMIN — SODIUM CHLORIDE, PRESERVATIVE FREE 10 ML: 5 INJECTION INTRAVENOUS at 08:42

## 2024-06-19 RX ADMIN — SODIUM CHLORIDE: 9 INJECTION, SOLUTION INTRAVENOUS at 13:13

## 2024-06-19 RX ADMIN — PROPOFOL 150 MCG/KG/MIN: 10 INJECTION, EMULSION INTRAVENOUS at 13:20

## 2024-06-19 RX ADMIN — PIPERACILLIN AND TAZOBACTAM 3375 MG: 3; .375 INJECTION, POWDER, LYOPHILIZED, FOR SOLUTION INTRAVENOUS at 23:02

## 2024-06-19 RX ADMIN — FENTANYL CITRATE 25 MCG: 50 INJECTION INTRAMUSCULAR; INTRAVENOUS at 13:19

## 2024-06-19 RX ADMIN — HYDRALAZINE HYDROCHLORIDE 10 MG: 20 INJECTION INTRAMUSCULAR; INTRAVENOUS at 08:41

## 2024-06-19 RX ADMIN — METOPROLOL TARTRATE 5 MG: 5 INJECTION INTRAVENOUS at 11:22

## 2024-06-19 RX ADMIN — FENTANYL CITRATE 25 MCG: 50 INJECTION INTRAMUSCULAR; INTRAVENOUS at 13:23

## 2024-06-19 RX ADMIN — PROPOFOL 20 MG: 10 INJECTION, EMULSION INTRAVENOUS at 13:19

## 2024-06-19 RX ADMIN — PIPERACILLIN AND TAZOBACTAM 3375 MG: 3; .375 INJECTION, POWDER, LYOPHILIZED, FOR SOLUTION INTRAVENOUS at 05:13

## 2024-06-19 RX ADMIN — PIPERACILLIN AND TAZOBACTAM 3375 MG: 3; .375 INJECTION, POWDER, LYOPHILIZED, FOR SOLUTION INTRAVENOUS at 15:47

## 2024-06-19 RX ADMIN — INSULIN GLARGINE 20 UNITS: 100 INJECTION, SOLUTION SUBCUTANEOUS at 20:35

## 2024-06-19 ASSESSMENT — PAIN SCALES - GENERAL
PAINLEVEL_OUTOF10: 0
PAINLEVEL_OUTOF10: 0

## 2024-06-19 ASSESSMENT — PAIN - FUNCTIONAL ASSESSMENT
PAIN_FUNCTIONAL_ASSESSMENT: NONE - DENIES PAIN
PAIN_FUNCTIONAL_ASSESSMENT: 0-10

## 2024-06-19 ASSESSMENT — LIFESTYLE VARIABLES: SMOKING_STATUS: 0

## 2024-06-19 ASSESSMENT — ENCOUNTER SYMPTOMS: SHORTNESS OF BREATH: 0

## 2024-06-19 NOTE — PROGRESS NOTES
[facility]  Glycemic Control       NAME: Srini Recio  MEDICAL RECORD NUMBER:  [id number]  AGE: 53 y.o.   GENDER: male  : 1970  EPISODE DATE:  2024     Data     Recent Labs     24  1156 24  1610 24  1922 24  0733 24  1135 24  1348   POCGLU 237* 104* 127* 159* 131* 132*       HgBA1c:    Lab Results   Component Value Date/Time    LABA1C 13.2 06/15/2024 07:38 PM       BUN/Creatinine:    Lab Results   Component Value Date/Time    BUN 14 06/15/2024 06:29 PM    CREATININE 1.2 2024 05:05 AM       Medications  Scheduled Medications:   enoxaparin  40 mg SubCUTAneous Daily    NIFEdipine  30 mg Oral Daily    insulin glargine  20 Units SubCUTAneous Nightly    insulin lispro  6 Units SubCUTAneous TID WC    sodium chloride flush  5-40 mL IntraVENous 2 times per day    insulin lispro  0-8 Units SubCUTAneous TID WC    insulin lispro  0-4 Units SubCUTAneous Nightly    piperacillin-tazobactam  3,375 mg IntraVENous Q8H       Diet  Current diet/supplement order: ADULT DIET; Regular; 5 carb choices (75 gm/meal)     Recorded PO: PO Meals Eaten (%): 1 - 25% last meal in flowsheets      Action      Recommend continuing with current plan.      Just returned from surgery.      Electronically signed by Joanne Rock RN on 2024 at 2:35 PM

## 2024-06-19 NOTE — FLOWSHEET NOTE
Pre-op skin cleansing done with CHG wipes. Gown changed. Instructed to keep on NPO, verbalized understanding.    Pre-op checklist accomplished.

## 2024-06-19 NOTE — CONSULTS
Nutrition Note    Consult received for diabetes diet education. Pt in out of room in OR, will provide education for pt when available    Electronically signed by Lance Zamora RD on 6/19/24 at 2:02 PM EDT    Contact: [phone]

## 2024-06-19 NOTE — PLAN OF CARE
Problem: Discharge Planning  Goal: Discharge to home or other facility with appropriate resources  Outcome: Progressing  Flowsheets (Taken 6/18/2024 2117)  Discharge to home or other facility with appropriate resources: Identify barriers to discharge with patient and caregiver     Problem: Pain  Goal: Verbalizes/displays adequate comfort level or baseline comfort level  Outcome: Progressing     Problem: Chronic Conditions and Co-morbidities  Goal: Patient's chronic conditions and co-morbidity symptoms are monitored and maintained or improved  Outcome: Progressing  Flowsheets (Taken 6/18/2024 2117)  Care Plan - Patient's Chronic Conditions and Co-Morbidity Symptoms are Monitored and Maintained or Improved: Monitor and assess patient's chronic conditions and comorbid symptoms for stability, deterioration, or improvement     Problem: Safety - Adult  Goal: Free from fall injury  Outcome: Progressing     Problem: Cardiovascular - Adult  Goal: Maintains optimal cardiac output and hemodynamic stability  Outcome: Progressing  Flowsheets (Taken 6/18/2024 2117)  Maintains optimal cardiac output and hemodynamic stability: Monitor blood pressure and heart rate  Goal: Absence of cardiac dysrhythmias or at baseline  Outcome: Progressing  Flowsheets (Taken 6/18/2024 2117)  Absence of cardiac dysrhythmias or at baseline: Monitor cardiac rate and rhythm     Problem: Musculoskeletal - Adult  Goal: Return mobility to safest level of function  Outcome: Progressing  Flowsheets (Taken 6/18/2024 2117)  Return Mobility to Safest Level of Function: Assess patient stability and activity tolerance for standing, transferring and ambulating with or without assistive devices  Goal: Return ADL status to a safe level of function  Outcome: Progressing  Flowsheets (Taken 6/18/2024 2117)  Return ADL Status to a Safe Level of Function: Assist and instruct patient to increase activity and self care as tolerated     Problem: Metabolic/Fluid and

## 2024-06-19 NOTE — CARE COORDINATION
Discharge Planning:  Perfectserve to Dr. Leon requesting PT/OT consult.  Pt is from home with spouse.  Podiatry and ID are following.  Per ID, home on oral abs.  Awaiting PT/OT recommendations.   AUGIE Zimmer  [phone]  Electronically signed by AUGIE Jean Baptiste on 6/19/2024 at 3:48 PM

## 2024-06-19 NOTE — ANESTHESIA PRE PROCEDURE
Department of Anesthesiology  Preprocedure Note       Name:  Srini Recio   Age:  53 y.o.  :  1970                                          MRN:  [id number]         Date:  2024      Surgeon: Surgeon(s):  Beto Hassan DPM    Procedure: Procedure(s):  AMPUTATION DISTAL RIGHT SECOND TOE    Medications prior to admission:   Prior to Admission medications    Medication Sig Start Date End Date Taking? Authorizing Provider   blood glucose monitor kit and supplies Dispense sufficient amount for indicated testing frequency plus additional to accommodate PRN testing needs. Dispense all needed supplies to include: monitor, strips, lancing device, lancets, control solutions, alcohol swabs. 24  Yes Edmar Jewell MD   Lancets MISC 1 each by Does not apply route 4 times daily (before meals and nightly) 24  Yes Edmar Jewell MD   blood glucose monitor strips Test 4 times a day & as needed for symptoms of irregular blood glucose. Dispense sufficient amount for indicated testing frequency plus additional to accommodate PRN testing needs. 24  Yes Edmar Jewell MD   Continuous Glucose Sensor (DEXCOM G7 SENSOR) MISC 1 Device by Does not apply route every 10 days 24  Yes Edmar Jewell MD   Continuous Glucose  (DEXCOM G7 ) NAKUL 1 Device by Does not apply route 4 times daily (before meals and nightly) 24  Yes Edmar Jewell MD       Current medications:    Current Facility-Administered Medications   Medication Dose Route Frequency Provider Last Rate Last Admin    sodium chloride flush 0.9 % injection 5-40 mL  5-40 mL IntraVENous 2 times per day Manjeet Choi MD        sodium chloride flush 0.9 % injection 5-40 mL  5-40 mL IntraVENous PRN Manjeet Choi MD        0.9 % sodium chloride infusion   IntraVENous PRN Manjeet Choi MD        metoprolol (LOPRESSOR) injection 5 mg  5 mg IntraVENous Q6H PRN Angel Leon MD   5 mg at 24 1122    enoxaparin (LOVENOX) injection 40 mg

## 2024-06-19 NOTE — ANESTHESIA POSTPROCEDURE EVALUATION
Department of Anesthesiology  Postprocedure Note    Patient: Srini Recio  MRN: [id number]  YOB: 1970  Date of evaluation: 6/19/2024    Procedure Summary       Date: 06/19/24 Room / Location: [address]    Anesthesia Start: 1313 Anesthesia Stop: 1341    Procedure: AMPUTATION DISTAL RIGHT SECOND TOE (Right: Second Toe) Diagnosis:       Acute osteomyelitis of right foot (HCC)      (Acute osteomyelitis of right foot (HCC) [M86.171])    Surgeons: Beto Hassan DPM Responsible Provider: Amirah Pearson MD    Anesthesia Type: MAC ASA Status: 3            Anesthesia Type: No value filed.    Olga Phase I: Olga Score: 9    Olga Phase II:      Anesthesia Post Evaluation    Patient location during evaluation: bedside  Patient participation: complete - patient participated  Level of consciousness: awake and alert  Pain score: 2  Airway patency: patent  Nausea & Vomiting: no vomiting  Cardiovascular status: blood pressure returned to baseline  Respiratory status: acceptable  Hydration status: euvolemic  Pain management: adequate    No notable events documented.

## 2024-06-19 NOTE — PROGRESS NOTES
V2.0    [facility] [place]      Name:  Srini Recio /Age/Sex: 1970  (53 y.o. male)   MRN & CSN:  [id number] & [id number] Encounter Date/Time: 2024 7:59 AM EDT   Location:  OR/NONE PCP: Beto Espinoza DO     Attending:Angel Leon MD       Hospital Day: 5    Assessment and Recommendations       Srini Recio is a 53 y.o. male with pmh of DM, HTN who presents with Osteomyelitis (HCC)          1. Right foot osteomyelitis   2. Right LL DVT Ruled out  3.          Elevated D-dimer  3. Uncontrolled DM   4. Uncontrolled HTN      Plan:  OR today at 1 PM  for amputation of distal phalanx RIGHT 2nd digit with podiatry  -On zosyn 3.375gm IV Q8H  -CT Rt foot: Cellulitis with erosions involving the second digit suggestive of osteomyelitis with an adjacent small abscess  - F/U B/L LE arterial duplex   Continue home blood pressure medication  Continue current insulin regimen  ID following- plan for PO augmentin 875mg X 14 days on discharge.         DVT Prophylaxis: Lovenox.   Code Status: Total Support.   Barrier to DC: IV antibiotics, OR today. Podiatry clearance.     Discussed with IDR team - plan for OR today. Podiatry clearance pending.     Monitor zosyn with Cr and CBC   Monitor lantus/humalog with POCT glucose. Watch for hypoglycemia.           Subjective:     Patient was seen and examined today.  No acute events overnight.  Patient remains afebrile with stable vital signs. Denies pain over RLE. Does have a foul smell. Plan for OR today.     Review of Systems:      Pertinent positives and negatives discussed in HPI    Objective:     Intake/Output Summary (Last 24 hours) at 2024 1221  Last data filed at 2024 0545  Gross per 24 hour   Intake 603.55 ml   Output --   Net 603.55 ml        Vitals:   Vitals:    24 0734 24 0815 24 1037 24 1208   BP: (!) 194/102 (!) 180/99 (!) 190/97 (!) 180/98   Pulse: 69 67 73 69   Resp: 16 14 16 16   Temp: 97.9 °F (36.6 °C) 98 °F

## 2024-06-19 NOTE — BRIEF OP NOTE
Brief Postoperative Note      Patient: Srini Recio  YOB: 1970  MRN: [id number]    Date of Procedure: 6/19/2024    Pre-Op Diagnosis Codes:     * Acute osteomyelitis of right foot (HCC) [M86.171]    Post-Op Diagnosis: Same       Procedure(s):  AMPUTATION DISTAL RIGHT SECOND TOE    Surgeon(s):  Beto Hassan DPM    Assistant:  Surgical Assistant: Martinez Bernstein; Lesly Joy    Anesthesia: Monitor Anesthesia Care    Estimated Blood Loss (mL): Minimal    Hemostasis: anatomic dissection and toe tournicot for 8 minutes    Injectables: Pre-Op 10 cc of 2% Polocaine plain     Materials: 4-0 Nylon    Implants: None      Complications: None    Specimens:   ID Type Source Tests Collected by Time Destination   A : right second distal phalanx Specimen Toe SURGICAL PATHOLOGY Beto Hassan DPM 6/19/2024 1329        Implants:  * No implants in log *      Drains: * No LDAs found *    Findings:  Infection Present At Time Of Surgery (PATOS) (choose all levels that have infection present):  - Organ Space infection (below fascia) present as evidenced by osteomyelitis  Other Findings: Full thickness ulceration appreciated to the distal tuft of the right foot second toe. Osteomyelitis of the right second distal phalanx. The head of the middle phalanx was appreciated to be white, hard and shiny consistent with healthy non-infected bone. The incision site was noted to be fully closed.    DISPO: S/P Amputation of distal second toe, Right foot. Procedure was felt to be definitive; final ID recommendations per ID. No purulence was appreciated. Post op XR ordered. Patient is partial heel weightbearing to right foot with surgical shoe. Patient is stable for discharge pending final ID recommendations, PT?OT recommendations and medical clearance.     Electronically signed by Nitza Kern DPM on 6/19/2024 at 1:48 PM

## 2024-06-19 NOTE — PLAN OF CARE
Problem: Discharge Planning  Goal: Discharge to home or other facility with appropriate resources  6/18/2024 2358 by Briseida Razo, RN  Outcome: Progressing  Flowsheets (Taken 6/18/2024 2117)  Discharge to home or other facility with appropriate resources: Identify barriers to discharge with patient and caregiver     Problem: Pain  Goal: Verbalizes/displays adequate comfort level or baseline comfort level  6/19/2024 1009 by Randi Mckinnon RN  Outcome: Progressing  6/18/2024 2358 by Briseida Razo, RN  Outcome: Progressing     Problem: Safety - Adult  Goal: Free from fall injury  6/18/2024 2358 by Briseida Razo, RN  Outcome: Progressing

## 2024-06-19 NOTE — PROGRESS NOTES
Pt back from surgery. TELE box applied. Pt instructed on non weight bearing on the right foot. Advised to walk on heel and avoid putting pressure on the surgical site. He verbalized understanding. Diet reordered . Blood glucose rechecked and is in file.

## 2024-06-19 NOTE — PROGRESS NOTES
4 Eyes Skin Assessment     NAME:  Srini Recio  YOB: 1970  MEDICAL RECORD NUMBER:  [id number]    The patient is being assessed for  Post-Op Surgical    I agree that at least one RN has performed a thorough Head to Toe Skin Assessment on the patient. ALL assessment sites listed below have been assessed.      Areas assessed by both nurses:    Head, Face, Ears, Shoulders, Back, Chest, Arms, Elbows, Hands, Sacrum. Buttock, Coccyx, Ischium, Legs. Feet and Heels, and Under Medical Devices         Does the Patient have a Wound? Yes wound(s) were present on assessment. LDA wound assessment was Initiated and completed by RN. Pt had a right second toe amputation.       Robe Prevention initiated by RN: No    Pressure Injury (Stage 3,4, Unstageable, DTI, NWPT, and Complex wounds) if present, place Wound referral order by RN under : No    New Ostomies, if present place, Ostomy referral order under : No     Nurse 1 eSignature: Electronically signed by Randi Mckinnon RN on 6/19/24 at 3:40 PM EDT    **SHARE this note so that the co-signing nurse can place an eSignature**    Nurse 2 eSignature: Electronically signed by Ranjeet Steinberg RN on 6/19/24 at 3:43 PM EDT

## 2024-06-19 NOTE — OP NOTE
was obtained.     DETAILS OF PROCEDURE: The patient was brought from the pre-operative area and placed on the operating table in the supine position. A tournicot was applied to the proximal portion of the second toe of the right foot. Following IV sedation, a local anesthetic block was then injected proximal to the incision site consisting of 10 cc of 2% Polocaine plain. The right lower extremity was then scrubbed, prepped, and draped in the usual sterile fashion. A time-out was performed. The patient, procedure, and operative site were confirmed.     DETAILED DESCRIPTION OF PROCEDURE #1: AMPUTATION OF DISTAL SECOND TOW, RIGHT FOOT:  Attention was then directed to the second digit on the right foot where a full thickness ulceration was appreciated at the distal tuft of the toe. A towel clamp was then clamped around the distal aspect of the second digit and used to stabilize the digit. A #15 surgical blade was used to make a full thickness incision through the skin. The incision was then deepened through the subcutaneous tissue to the level of bone. Next, the distal interphalangeal joint was identified and attention was then directed to this location. A #15 blade was then used to release the extensor tendon as well as the medial and lateral collateral ligaments present at the distal interphalangeal joint. The flexor tendon was then severed plantarly at the level of the distal interphalangeal joint and digit removed distally and passed from the operative field and placed on the back table.    Attention was then directed to the head of the head of the middle phalanx. The cartilage was noted to be white, shiny, pearly and hard, all which are consistent with healthy cartilage. It was determined that no further resection was necessary. Utilizing the pulse lavage the incision site was flushed with copious amount of normal saline At this time, the incision site was flushed using copious amounts of normal saline. The  incision site was closed using 4-0 Nylon.       At this time, a soft sterile dressing was applied consisting of xeroform, dry gauze, cling, and coban. The toe tournicot was removed and a prompt hyperemic response was noted on all aspects of the patient's right lower extremity.    END OF PROCEDURE: The patient tolerated the procedure and anesthesia well and was transported from the operating room to the PACU with vital signs stable and vascular status intact to all aspects of the patient's right lower extremity and digital capillary refill time immediate to the digits of the right foot. Following a period of post-operative monitoring, the patient will be transferred back to the floor for continued care.  The patient is to follow-up with Dr. Beto Hassan in his private office within 5-7 days of discharge. The patient is to keep dressing clean, dry and intact at all times. The patient is to call if any complications occur.    This operative report was dictated on behalf of Dr. Beto Hassan DPM.    DISPO: S/P Amputation of distal second toe, Right foot. Procedure was felt to be definitive; final ID recommendations per ID. No purulence was appreciated. Post op XR ordered. Patient is partial heel weightbearing to right foot with surgical shoe. Patient is stable for discharge pending final ID recommendations, PT?OT recommendations and medical clearance.     Electronically signed by Nitza Kern DPM on 6/19/2024 at 3:49 PM

## 2024-06-20 VITALS
RESPIRATION RATE: 18 BRPM | TEMPERATURE: 98.1 F | WEIGHT: 221.34 LBS | BODY MASS INDEX: 29.98 KG/M2 | OXYGEN SATURATION: 98 % | HEIGHT: 72 IN | DIASTOLIC BLOOD PRESSURE: 99 MMHG | HEART RATE: 84 BPM | SYSTOLIC BLOOD PRESSURE: 154 MMHG

## 2024-06-20 LAB
GLUCOSE BLD-MCNC: 128 MG/DL (ref 70–99)
GLUCOSE BLD-MCNC: 165 MG/DL (ref 70–99)
GLUCOSE BLD-MCNC: 195 MG/DL (ref 70–99)
PERFORMED ON: ABNORMAL

## 2024-06-20 PROCEDURE — 97162 PT EVAL MOD COMPLEX 30 MIN: CPT

## 2024-06-20 PROCEDURE — 6360000002 HC RX W HCPCS

## 2024-06-20 PROCEDURE — 97530 THERAPEUTIC ACTIVITIES: CPT

## 2024-06-20 PROCEDURE — 6370000000 HC RX 637 (ALT 250 FOR IP)

## 2024-06-20 PROCEDURE — 2580000003 HC RX 258

## 2024-06-20 PROCEDURE — 97166 OT EVAL MOD COMPLEX 45 MIN: CPT

## 2024-06-20 PROCEDURE — 99232 SBSQ HOSP IP/OBS MODERATE 35: CPT | Performed by: INTERNAL MEDICINE

## 2024-06-20 RX ORDER — INSULIN LISPRO 100 [IU]/ML
6 INJECTION, SOLUTION INTRAVENOUS; SUBCUTANEOUS
Qty: 5 ADJUSTABLE DOSE PRE-FILLED PEN SYRINGE | Refills: 0 | Status: SHIPPED | OUTPATIENT
Start: 2024-06-20

## 2024-06-20 RX ORDER — INSULIN GLARGINE 100 [IU]/ML
20 INJECTION, SOLUTION SUBCUTANEOUS NIGHTLY
Qty: 5 ADJUSTABLE DOSE PRE-FILLED PEN SYRINGE | Refills: 0 | Status: SHIPPED | OUTPATIENT
Start: 2024-06-20

## 2024-06-20 RX ORDER — HYDROCODONE BITARTRATE AND ACETAMINOPHEN 5; 325 MG/1; MG/1
1 TABLET ORAL EVERY 6 HOURS PRN
Qty: 12 TABLET | Refills: 0 | Status: SHIPPED | OUTPATIENT
Start: 2024-06-20 | End: 2024-06-23

## 2024-06-20 RX ORDER — NIFEDIPINE 30 MG/1
90 TABLET, EXTENDED RELEASE ORAL DAILY
Qty: 90 TABLET | Refills: 0 | Status: SHIPPED | OUTPATIENT
Start: 2024-06-21 | End: 2024-07-21

## 2024-06-20 RX ORDER — AMOXICILLIN AND CLAVULANATE POTASSIUM 875; 125 MG/1; MG/1
1 TABLET, FILM COATED ORAL 2 TIMES DAILY
Qty: 28 TABLET | Refills: 0 | Status: SHIPPED | OUTPATIENT
Start: 2024-06-20 | End: 2024-07-04

## 2024-06-20 RX ADMIN — PIPERACILLIN AND TAZOBACTAM 3375 MG: 3; .375 INJECTION, POWDER, LYOPHILIZED, FOR SOLUTION INTRAVENOUS at 05:44

## 2024-06-20 RX ADMIN — NIFEDIPINE 30 MG: 30 TABLET, EXTENDED RELEASE ORAL at 08:29

## 2024-06-20 RX ADMIN — ENOXAPARIN SODIUM 40 MG: 100 INJECTION SUBCUTANEOUS at 08:29

## 2024-06-20 RX ADMIN — INSULIN LISPRO 6 UNITS: 100 INJECTION, SOLUTION INTRAVENOUS; SUBCUTANEOUS at 08:29

## 2024-06-20 RX ADMIN — SODIUM CHLORIDE, PRESERVATIVE FREE 10 ML: 5 INJECTION INTRAVENOUS at 08:38

## 2024-06-20 RX ADMIN — INSULIN LISPRO 6 UNITS: 100 INJECTION, SOLUTION INTRAVENOUS; SUBCUTANEOUS at 12:43

## 2024-06-20 RX ADMIN — HYDRALAZINE HYDROCHLORIDE 10 MG: 20 INJECTION INTRAMUSCULAR; INTRAVENOUS at 08:38

## 2024-06-20 ASSESSMENT — PAIN SCALES - GENERAL: PAINLEVEL_OUTOF10: 0

## 2024-06-20 NOTE — PLAN OF CARE
Problem: Discharge Planning  Goal: Discharge to home or other facility with appropriate resources  Outcome: Progressing  Flowsheets (Taken 6/19/2024 2039)  Discharge to home or other facility with appropriate resources:   Identify barriers to discharge with patient and caregiver   Arrange for needed discharge resources and transportation as appropriate   Identify discharge learning needs (meds, wound care, etc)     Problem: Pain  Goal: Verbalizes/displays adequate comfort level or baseline comfort level  6/19/2024 2142 by Betzaida Blount RN  Outcome: Progressing  6/19/2024 1009 by Randi Mckinnon RN  Outcome: Progressing     Problem: Chronic Conditions and Co-morbidities  Goal: Patient's chronic conditions and co-morbidity symptoms are monitored and maintained or improved  6/19/2024 2142 by Betzaida Blount RN  Outcome: Progressing  Flowsheets (Taken 6/19/2024 2039)  Care Plan - Patient's Chronic Conditions and Co-Morbidity Symptoms are Monitored and Maintained or Improved:   Monitor and assess patient's chronic conditions and comorbid symptoms for stability, deterioration, or improvement   Collaborate with multidisciplinary team to address chronic and comorbid conditions and prevent exacerbation or deterioration   Update acute care plan with appropriate goals if chronic or comorbid symptoms are exacerbated and prevent overall improvement and discharge  6/19/2024 1009 by Randi Mckinnon RN  Outcome: Progressing     Problem: Safety - Adult  Goal: Free from fall injury  Outcome: Progressing     Problem: Cardiovascular - Adult  Goal: Maintains optimal cardiac output and hemodynamic stability  Outcome: Progressing  Flowsheets (Taken 6/19/2024 2039)  Maintains optimal cardiac output and hemodynamic stability:   Monitor blood pressure and heart rate   Monitor urine output and notify Licensed Independent Practitioner for values outside of normal range   Assess for signs of decreased cardiac output  Goal:

## 2024-06-20 NOTE — PROGRESS NOTES
Diagnoses: Type 2 diabetes mellitus with hyperglycemia, unspecified whether long term insulin use (HCC)      blood glucose monitor strips Test 4 times a day & as needed for symptoms of irregular blood glucose. Dispense sufficient amount for indicated testing frequency plus additional to accommodate PRN testing needs.  Qty: 200 strip, Refills: 3    Comments: Cover with brand preferred by insurance  Associated Diagnoses: Type 2 diabetes mellitus with hyperglycemia, unspecified whether long term insulin use (HCC)      Continuous Glucose Sensor (DEXCOM G7 SENSOR) MISC 1 Device by Does not apply route every 10 days  Qty: 3 each, Refills: 3    Associated Diagnoses: Type 2 diabetes mellitus with hyperglycemia, unspecified whether long term insulin use (HCC)      Continuous Glucose  (DEXCOM G7 ) NAKUL 1 Device by Does not apply route 4 times daily (before meals and nightly)  Qty: 1 each, Refills: 0    Associated Diagnoses: Type 2 diabetes mellitus with hyperglycemia, unspecified whether long term insulin use (HCC)               Additional Documentation:  All meds except long acting insulin delivered at bedside, retail pharmacy out of stock on insulin we ordered for tomorrow and pt says he will come back and

## 2024-06-20 NOTE — PLAN OF CARE
Problem: Discharge Planning  Goal: Discharge to home or other facility with appropriate resources  6/19/2024 2142 by Betzaida Blount, RN  Outcome: Progressing  Flowsheets (Taken 6/19/2024 2039)  Discharge to home or other facility with appropriate resources:   Identify barriers to discharge with patient and caregiver   Arrange for needed discharge resources and transportation as appropriate   Identify discharge learning needs (meds, wound care, etc)     Problem: Pain  Goal: Verbalizes/displays adequate comfort level or baseline comfort level  6/20/2024 0955 by Randi Mckinnon, RN  Outcome: Progressing  6/19/2024 2142 by Betzaida Blount, RN  Outcome: Progressing     Problem: Safety - Adult  Goal: Free from fall injury  6/19/2024 2142 by Betzaida Blount, RN  Outcome: Progressing

## 2024-06-20 NOTE — DISCHARGE SUMMARY
Please use this note as a progress note for the day if the patient does not discharge today      Discharge Summary    Name:  Srini Recio /Age/Sex: 1970  (53 y.o. male)   MRN & CSN:  [id number] & [id number] Admission Date/Time: 6/15/2024  5:51 PM   Attending:  Angel Leon MD Discharging Physician: Angel Leon MD       Admission Diagnosis:   Right foot osteomyelitis   Suspected Right LL DVT   Uncontrolled DM   Uncontrolled HTN   Class I Obesity     Discharge Diagnosis, hospital course, assessment and plan:  Srini Recio is a 53 y.o.  male  who presents with Osteomyelitis (HCC)    Patient admitted on 6/15/2024  5:51 PM    Per H+P:  Srini Recio is a 53 y.o. male with pmh of DM , HTN who came in because he had 2 days swelling of his right foot and ankle   Swelling was gradual onset and did not respond to leg elevation or over the counter diuretics. Did not has injury of the foot no fever   He thought he was dealing with a fungal infection on the foot but has some wound breakdown around the second toe that he does not really know how long it has been there. He has been noncompliant with medications (for BP and diabetes) for about 5 months due to issues with his work schedule.     1.         Right foot osteomyelitis s/p Amputation of distal phalanx RIGHT 2nd digit with podiatry  2.         Right LL DVT Ruled out  3.          Elevated D-dimer  3.         Uncontrolled DM. A1c 13.2%  4.         Uncontrolled HTN      Plan:  -On zosyn 3.375gm IV Q8H  -CT Rt foot: Cellulitis with erosions involving the second digit suggestive of osteomyelitis with an adjacent small abscess  Continue home blood pressure medication  Continue current insulin regimen  ID following- plan for PO augmentin 875mg X 14 days on discharge.   Insulin - humalog and lantus on discharge.       Patient stable enough to be discharged home today.     The patient expressed appropriate understanding of and agreement with the  Soft tissue defect of the distal aspect of the 2nd toe.  No radiopaque foreign body or soft tissue gas.     1. Diffuse soft tissue swelling with a soft tissue defect of the distal aspect of the 2nd toe. Suspected erosion of the tuft of the 2nd distal phalanx concerning for osteomyelitis.  Consider further evaluation with CT or MRI. 2. Nonspecific periosteal thickening along the medial malleolus.  Consider previous trauma or chronic venous stasis.       Additional Information: Patient seen and examined day of discharge. For more information regarding patient's care please contact [facility] medical records [phone]    Discharge Time of 45 minutes    Electronically signed by Angel Leon MD on 6/20/2024 at 12:57 PM

## 2024-06-20 NOTE — PROGRESS NOTES
cellulitis  Right second toe osteomyelitis  Diabetes poor control  Hemoglobin A1c greater than 13  Elevated blood glucose  Hypertension  CT of the right foot indicating erosion of the second digit distal phalanx with fluid collection concerning for abscess and osteomyelitis  ESR elevation  CRP elevation    Podiatry note reviewed May be going for surgery of the right second toe amputation arterial duplex scan no PVD noted  Status post right second toe amputation per operative report it was definite surgery with resection of the infected toe wound closed      Will be able to choose oral antibiotic for discharge planning          Labs, Microbiology, Radiology and pertinent results from current hospitalization and care every where were reviewed by me as a part of the consultation.    PLAN :  Cont IV Zosyn x 3.375  mg Q 8 HR  Trend esr, crp  Wound cx requested   Podiatry consult  Rt leg elevation   Arterial Duplex no PVD noted   DM control d/w pt   Needs DM education and compliance   Risk for limb loss       Home on oral Augmentin x 875 mg q 12 hr x 14 days at d/c     Will sign off    Discussed with patient/Family and Nursing     Medical Decision Making:  The following items were considered in medical decision making:  Discussion of patient care with other providers  Reviewed clinical lab tests  Reviewed radiology tests  Reviewed other diagnostic tests/interventions  Independent review of radiologic images  Independent review of  Microbiology cultures and other micro tests reviewed     Risk of Complications/Morbidity: High      Illness(es)/ Infection present that pose threat to bodily function.   There is potential for severe exacerbation of infection/side effects of treatment.  Therapy requires intensive monitoring for antimicrobial agent toxicity.     Thanks for allowing me to participate in your patient's care please call me with any questions or concerns.    Dr. Gloria Mcfarland MD  Infectious Disease  [facility]

## 2024-06-20 NOTE — PROGRESS NOTES
Occupational Therapy  Facility/Department: [address] MED SURG  Occupational Therapy Initial Assessment/Discharge    Name: Srini Recio  : 1970  MRN: [id number]  Date of Service: 2024    Discharge Recommendations:  Home with assist PRN  OT Equipment Recommendations  Other: Would benefit from a shower chair to promote safety and adherence to WB restrictions  Srini Recio scored a 22/24 on the AM-PAC ADL Inpatient form.  At this time, no further OT is recommended upon discharge due to pt nearing baseline function.  Recommend patient returns to prior setting with prior services.         Patient Diagnosis(es): The primary encounter diagnosis was Other acute osteomyelitis of right foot (HCC). Diagnoses of Uncontrolled hypertension, Right ankle swelling, Elevated d-dimer, H/O medication noncompliance, Type 2 diabetes mellitus with hyperglycemia, unspecified whether long term insulin use (HCC), and Acute osteomyelitis of right foot (HCC) were also pertinent to this visit.  Past Medical History:  has a past medical history of Type 2 diabetes mellitus (HCC).  Past Surgical History:  has a past surgical history that includes hernia repair and Toe amputation (Right, 2024).       Assessment   Assessment: Pt is a 54 yo M who presented with right foot pain. Found to have osteomyelitis and underwent R distal 2nd toe amp on 24 w/ Dr. Hassan - orders for heel WB R LE. At baseline, pt lives in a house with his wife where he is highly independent and working FT. He is functioning close to baseline at this time, completing functional transfers and mobility independently. He did require cueing to maintain heel WB R LE. Education provided on safe activity levels at home to allow proper healing to R LE. No further OT needs. Anticipate safe return home with assist PRN when medically stable.  Decision Making: Low Complexity  REQUIRES OT FOLLOW-UP: No  Activity Tolerance  Activity Tolerance: Patient Tolerated  OTR/L 11410

## 2024-06-20 NOTE — PROGRESS NOTES
Reviewed: Yes  Patient assessed for rehabilitation services?: Yes (Simultaneous filing. User may not have seen previous data.)  Additional Pertinent Hx: Pt is a 53 y.o. male who presented to the ED on 6/15/24 with R foot pain. Pt underwent R distal 2nd toe amp on 6/19/24 secondary to osetomyelitis. Pt is now heel WB to RLE.  Response To Previous Treatment: Not applicable  Family / Caregiver Present: No  Referring Practitioner: Angel Leon MD  Referral Date : 06/19/24  Diagnosis: R 2nd toe amp  Follows Commands: Within Functional Limits  Subjective  Subjective: Pt is agreeable to PT. Reports he ambulated 23 laps around unit early this AM - likely not following WB restriction.         Social/Functional History  Social/Functional History  Lives With: Spouse, Son  Type of Home: House  Home Layout: Two level, Able to Live on Main level with bedroom/bathroom, 1/2 bath on main level, Bed/Bath upstairs  Home Access: Stairs to enter without rails  Entrance Stairs - Number of Steps: 1  Bathroom Shower/Tub: Walk-in shower  Bathroom Toilet: Standard  Bathroom Equipment: Grab bars in shower  Home Equipment: Walker - Rolling  ADL Assistance: Independent  Homemaking Assistance: Independent  Ambulation Assistance: Independent  Transfer Assistance: Independent  Active : Yes  Mode of Transportation: Car  Occupation: Full time employment      Cognition   Orientation  Overall Orientation Status: Within Functional Limits  Cognition  Overall Cognitive Status: WFL  Cognition Comment: Some decreased insight     Objective   Gross Assessment  Strength: Within functional limits     Bed mobility  Supine to Sit: Independent  Sit to Supine: Independent  Transfers  Sit to Stand: Independent  Stand to Sit: Independent  Ambulation  Surface: Level tile  Device: No Device  Assistance: Supervision  Gait Deviations: None  Distance: 100'  Comments: Repeated cues for heel WB.     Balance  Posture: Good  Sitting - Static: Good  Sitting -

## 2024-06-20 NOTE — PROGRESS NOTES
Pt has a discharge order to home. Discharge instructions reviewed with Pt. Diabetes education completed by the Diabetes educator. Discharge medications delivered at bedside, except the long acting insulin that was ordered  Per Pharmacist. Pt instructed to stop by inpatient RX to pick it up tomorrow. Pt was agreeable. IV removed. A note for work printed, signed and given to Pt. All personal items gathered are in Pt's possession.

## 2024-06-20 NOTE — PROGRESS NOTES
[facility]  Diabetes Education   Progress Note       NAME:  Srini Recio  MEDICAL RECORD NUMBER:  [id number]  AGE: 53 y.o.   GENDER: male  : 1970  TODAY'S DATE:  2024    Subjective   Reason for Diabetic Education Evaluation and Assessment: glucose monitoring      CGM supplies available from pharmacy.  Srini is requesting set up prior to discharge.      Visit Type: follow-up      Srini Recio is a 53 y.o. male referred by:   [x] Physician  [] Nursing  [] Chart Review   [] Other:     PAST MEDICAL HISTORY        Diagnosis Date    Type 2 diabetes mellitus (HCC)        PAST SURGICAL HISTORY    Past Surgical History:   Procedure Laterality Date    HERNIA REPAIR      at birth    TOE AMPUTATION Right 2024    AMPUTATION DISTAL RIGHT SECOND TOE performed by Beto Hassan DPM at [facility] OR       FAMILY HISTORY    Family History   Problem Relation Age of Onset    Alcohol Abuse Father        SOCIAL HISTORY    Social History     Tobacco Use    Smoking status: Never    Smokeless tobacco: Never   Substance Use Topics    Alcohol use: Never    Drug use: Never       ALLERGIES    No Known Allergies    MEDICATIONS     enoxaparin  40 mg SubCUTAneous Daily    NIFEdipine  30 mg Oral Daily    insulin glargine  20 Units SubCUTAneous Nightly    insulin lispro  6 Units SubCUTAneous TID WC    sodium chloride flush  5-40 mL IntraVENous 2 times per day    insulin lispro  0-8 Units SubCUTAneous TID WC    insulin lispro  0-4 Units SubCUTAneous Nightly    piperacillin-tazobactam  3,375 mg IntraVENous Q8H       Objective        Patient Active Problem List   Diagnosis Code    Osteomyelitis (HCC) M86.9    Type 2 diabetes mellitus with diabetic foot infection (HCC) E11.628, L08.9    Cellulitis and abscess of foot L03.119, L02.619    Poorly controlled disease R69    Primary hypertension I10    Medical non-compliance Z91.199    CRP elevated R79.82    Foot swelling M79.89        BP (!) 154/99   Pulse 84   Temp 98.1 °F

## 2024-06-20 NOTE — PROGRESS NOTES
[facility]  Diabetes Education   Progress Note       NAME:  Srini Recio  MEDICAL RECORD NUMBER:  [id number]  AGE: 53 y.o.   GENDER: male  : 1970  TODAY'S DATE:  2024    Subjective   Reason for Diabetic Education Evaluation and Assessment: new insulin     Srini is hoping for discharge today.  He is receptive to using insulin at discharge.      Visit Type: follow-up      Srini Recio is a 53 y.o. male referred by:     [x] Physician  [] Nursing  [] Chart Review   [] Other:     PAST MEDICAL HISTORY        Diagnosis Date    Type 2 diabetes mellitus (HCC)        PAST SURGICAL HISTORY    Past Surgical History:   Procedure Laterality Date    HERNIA REPAIR      at birth    TOE AMPUTATION Right 2024    AMPUTATION DISTAL RIGHT SECOND TOE performed by Beto Hassan DPM at [facility] OR       FAMILY HISTORY    Family History   Problem Relation Age of Onset    Alcohol Abuse Father        SOCIAL HISTORY    Social History     Tobacco Use    Smoking status: Never    Smokeless tobacco: Never   Substance Use Topics    Alcohol use: Never    Drug use: Never       ALLERGIES    No Known Allergies    MEDICATIONS     enoxaparin  40 mg SubCUTAneous Daily    NIFEdipine  30 mg Oral Daily    insulin glargine  20 Units SubCUTAneous Nightly    insulin lispro  6 Units SubCUTAneous TID WC    sodium chloride flush  5-40 mL IntraVENous 2 times per day    insulin lispro  0-8 Units SubCUTAneous TID WC    insulin lispro  0-4 Units SubCUTAneous Nightly    piperacillin-tazobactam  3,375 mg IntraVENous Q8H       Objective        Patient Active Problem List   Diagnosis Code    Osteomyelitis (HCC) M86.9    Type 2 diabetes mellitus with diabetic foot infection (HCC) E11.628, L08.9    Cellulitis and abscess of foot L03.119, L02.619    Poorly controlled disease R69    Primary hypertension I10    Medical non-compliance Z91.199    CRP elevated R79.82    Foot swelling M79.89        BP (!) 181/91   Pulse 82   Temp 97.8 °F (36.6

## 2024-06-20 NOTE — CARE COORDINATION
CASE MANAGEMENT DISCHARGE SUMMARY:    DISCHARGE DATE: 6/20/24    DISCHARGED TO HOME     TRANSPORTATION: wife                Electronically signed by AUGIE Fraser on 6/20/2024 at 12:40 PM      Chart review done, rounds completed. Pt had sx yesterday, and doing well with therapy with mobility and WB. ID final recs are for PO medications. Pt is from home with wife.     Kofi Castellanos, SNOW, [facility] Social Work Case Management   Phone: [phone]  Fax: [phone]

## 2024-06-24 ENCOUNTER — TELEPHONE (OUTPATIENT)
Dept: FAMILY MEDICINE CLINIC | Age: 54
End: 2024-06-24

## 2024-06-24 NOTE — TELEPHONE ENCOUNTER
Care Transitions Initial Follow Up Call    Outreach made within 2 business days of discharge: Yes    Patient: Srini Recio Patient : 1970   MRN: [id number]  Reason for Admission: There are no discharge diagnoses documented for the most recent discharge.  Discharge Date: 24       Spoke with: Srini     Discharge department/facility: [facility]     Pt is no longer a pt of Dr. Espinoza, pt reports his current PCP  Dr. Rodriguez. Updated chart      Follow Up  Future Appointments   Date Time Provider Department Center   2024  8:30 AM SCHEDULE, [facility] SCHEDULE [facility] MANDEEP Carlisle MA

## 2025-01-18 ENCOUNTER — APPOINTMENT (OUTPATIENT)
Dept: GENERAL RADIOLOGY | Age: 55
DRG: 871 | End: 2025-01-18

## 2025-01-18 ENCOUNTER — HOSPITAL ENCOUNTER (INPATIENT)
Age: 55
LOS: 5 days | Discharge: HOME | DRG: 871 | End: 2025-01-23
Attending: EMERGENCY MEDICINE | Admitting: HOSPITALIST

## 2025-01-18 DIAGNOSIS — A41.9 SEVERE SEPSIS (HCC): Primary | ICD-10-CM

## 2025-01-18 DIAGNOSIS — Z79.4 TYPE 2 DIABETES MELLITUS WITH HYPERGLYCEMIA, WITH LONG-TERM CURRENT USE OF INSULIN (HCC): ICD-10-CM

## 2025-01-18 DIAGNOSIS — R41.0 CONFUSION: ICD-10-CM

## 2025-01-18 DIAGNOSIS — J96.01 ACUTE RESPIRATORY FAILURE WITH HYPOXIA: ICD-10-CM

## 2025-01-18 DIAGNOSIS — E11.65 TYPE 2 DIABETES MELLITUS WITH HYPERGLYCEMIA, WITH LONG-TERM CURRENT USE OF INSULIN (HCC): ICD-10-CM

## 2025-01-18 DIAGNOSIS — R65.20 SEVERE SEPSIS (HCC): Primary | ICD-10-CM

## 2025-01-18 DIAGNOSIS — N17.9 AKI (ACUTE KIDNEY INJURY) (HCC): ICD-10-CM

## 2025-01-18 DIAGNOSIS — J18.9 COMMUNITY ACQUIRED PNEUMONIA OF RIGHT LUNG, UNSPECIFIED PART OF LUNG: ICD-10-CM

## 2025-01-18 DIAGNOSIS — R79.89 ELEVATED TROPONIN: ICD-10-CM

## 2025-01-18 LAB
ALBUMIN SERPL-MCNC: 2.8 G/DL (ref 3.4–5)
ALBUMIN/GLOB SERPL: 0.7 {RATIO} (ref 1.1–2.2)
ALP SERPL-CCNC: 77 U/L (ref 40–129)
ALT SERPL-CCNC: 47 U/L (ref 10–40)
AMORPH SED URNS QL MICRO: ABNORMAL /HPF
ANION GAP SERPL CALCULATED.3IONS-SCNC: 18 MMOL/L (ref 3–16)
AST SERPL-CCNC: 65 U/L (ref 15–37)
BACTERIA URNS QL MICRO: ABNORMAL /HPF
BASE EXCESS BLDV CALC-SCNC: -2.3 MMOL/L (ref -3–3)
BASOPHILS # BLD: 0.1 K/UL (ref 0–0.2)
BASOPHILS NFR BLD: 1 %
BETA-HYDROXYBUTYRATE: 1.22 MMOL/L (ref 0–0.27)
BILIRUB SERPL-MCNC: 0.5 MG/DL (ref 0–1)
BILIRUB UR QL STRIP.AUTO: NEGATIVE
BUN SERPL-MCNC: 37 MG/DL (ref 7–20)
CALCIUM SERPL-MCNC: 8.1 MG/DL (ref 8.3–10.6)
CHLORIDE SERPL-SCNC: 87 MMOL/L (ref 99–110)
CLARITY UR: CLEAR
CO2 BLDV-SCNC: 20 MMOL/L
CO2 SERPL-SCNC: 21 MMOL/L (ref 21–32)
COLOR UR: YELLOW
CREAT SERPL-MCNC: 2.1 MG/DL (ref 0.9–1.3)
DEPRECATED RDW RBC AUTO: 15.6 % (ref 12.4–15.4)
EOSINOPHIL # BLD: 0 K/UL (ref 0–0.6)
EOSINOPHIL NFR BLD: 0.1 %
EPI CELLS #/AREA URNS HPF: ABNORMAL /HPF (ref 0–5)
FLUAV RNA UPPER RESP QL NAA+PROBE: NEGATIVE
FLUBV AG NPH QL: NEGATIVE
GFR SERPLBLD CREATININE-BSD FMLA CKD-EPI: 36 ML/MIN/{1.73_M2}
GLUCOSE BLD-MCNC: >600 MG/DL (ref 70–99)
GLUCOSE SERPL-MCNC: 639 MG/DL (ref 70–99)
GLUCOSE UR STRIP.AUTO-MCNC: >=1000 MG/DL
HCO3 BLDV-SCNC: 21.5 MMOL/L (ref 23–29)
HCT VFR BLD AUTO: 33.7 % (ref 40.5–52.5)
HGB BLD-MCNC: 10.7 G/DL (ref 13.5–17.5)
HGB UR QL STRIP.AUTO: ABNORMAL
KETONES UR STRIP.AUTO-MCNC: NEGATIVE MG/DL
LACTATE BLDV-SCNC: 1.9 MMOL/L (ref 0.4–1.9)
LACTATE BLDV-SCNC: 3.5 MMOL/L (ref 0.4–1.9)
LEUKOCYTE ESTERASE UR QL STRIP.AUTO: NEGATIVE
LYMPHOCYTES # BLD: 0.6 K/UL (ref 1–5.1)
LYMPHOCYTES NFR BLD: 4.4 %
MCH RBC QN AUTO: 22.1 PG (ref 26–34)
MCHC RBC AUTO-ENTMCNC: 31.6 G/DL (ref 31–36)
MCV RBC AUTO: 69.7 FL (ref 80–100)
MONOCYTES # BLD: 0.6 K/UL (ref 0–1.3)
MONOCYTES NFR BLD: 4.9 %
NEUTROPHILS # BLD: 11.7 K/UL (ref 1.7–7.7)
NEUTROPHILS NFR BLD: 89.6 %
NITRITE UR QL STRIP.AUTO: NEGATIVE
NT-PROBNP SERPL-MCNC: 264 PG/ML (ref 0–124)
O2 THERAPY: ABNORMAL
PATH INTERP BLD-IMP: NO
PCO2 BLDV: 30.1 MMHG (ref 40–50)
PERFORMED ON: ABNORMAL
PH BLDV: 7.46 [PH] (ref 7.35–7.45)
PH UR STRIP.AUTO: 5.5 [PH] (ref 5–8)
PLATELET # BLD AUTO: 278 K/UL (ref 135–450)
PLATELET BLD QL SMEAR: ADEQUATE
PMV BLD AUTO: 10.6 FL (ref 5–10.5)
PO2 BLDV: 51.8 MMHG (ref 25–40)
POTASSIUM SERPL-SCNC: 4.4 MMOL/L (ref 3.5–5.1)
PROT SERPL-MCNC: 6.9 G/DL (ref 6.4–8.2)
PROT UR STRIP.AUTO-MCNC: 100 MG/DL
RBC # BLD AUTO: 4.84 M/UL (ref 4.2–5.9)
RBC #/AREA URNS HPF: ABNORMAL /HPF (ref 0–4)
SAO2 % BLDV: 88 %
SARS-COV-2 RDRP RESP QL NAA+PROBE: NOT DETECTED
SLIDE REVIEW: ABNORMAL
SODIUM SERPL-SCNC: 126 MMOL/L (ref 136–145)
SP GR UR STRIP.AUTO: 1.02 (ref 1–1.03)
TROPONIN, HIGH SENSITIVITY: 36 NG/L (ref 0–22)
TROPONIN, HIGH SENSITIVITY: 40 NG/L (ref 0–22)
UA COMPLETE W REFLEX CULTURE PNL UR: YES
UA DIPSTICK W REFLEX MICRO PNL UR: YES
URN SPEC COLLECT METH UR: ABNORMAL
UROBILINOGEN UR STRIP-ACNC: 1 E.U./DL
WBC # BLD AUTO: 13 K/UL (ref 4–11)
WBC #/AREA URNS HPF: ABNORMAL /HPF (ref 0–5)

## 2025-01-18 PROCEDURE — 6370000000 HC RX 637 (ALT 250 FOR IP): Performed by: EMERGENCY MEDICINE

## 2025-01-18 PROCEDURE — 87040 BLOOD CULTURE FOR BACTERIA: CPT

## 2025-01-18 PROCEDURE — 71045 X-RAY EXAM CHEST 1 VIEW: CPT

## 2025-01-18 PROCEDURE — 83880 ASSAY OF NATRIURETIC PEPTIDE: CPT

## 2025-01-18 PROCEDURE — 2580000003 HC RX 258: Performed by: EMERGENCY MEDICINE

## 2025-01-18 PROCEDURE — 36415 COLL VENOUS BLD VENIPUNCTURE: CPT

## 2025-01-18 PROCEDURE — 96374 THER/PROPH/DIAG INJ IV PUSH: CPT

## 2025-01-18 PROCEDURE — 93005 ELECTROCARDIOGRAM TRACING: CPT | Performed by: EMERGENCY MEDICINE

## 2025-01-18 PROCEDURE — 82010 KETONE BODYS QUAN: CPT

## 2025-01-18 PROCEDURE — 83605 ASSAY OF LACTIC ACID: CPT

## 2025-01-18 PROCEDURE — 84484 ASSAY OF TROPONIN QUANT: CPT

## 2025-01-18 PROCEDURE — 80053 COMPREHEN METABOLIC PANEL: CPT

## 2025-01-18 PROCEDURE — 2060000000 HC ICU INTERMEDIATE R&B

## 2025-01-18 PROCEDURE — 85025 COMPLETE CBC W/AUTO DIFF WBC: CPT

## 2025-01-18 PROCEDURE — 2500000003 HC RX 250 WO HCPCS: Performed by: EMERGENCY MEDICINE

## 2025-01-18 PROCEDURE — 82803 BLOOD GASES ANY COMBINATION: CPT

## 2025-01-18 PROCEDURE — 6360000002 HC RX W HCPCS: Performed by: EMERGENCY MEDICINE

## 2025-01-18 PROCEDURE — 87804 INFLUENZA ASSAY W/OPTIC: CPT

## 2025-01-18 PROCEDURE — 96361 HYDRATE IV INFUSION ADD-ON: CPT

## 2025-01-18 PROCEDURE — 87635 SARS-COV-2 COVID-19 AMP PRB: CPT

## 2025-01-18 PROCEDURE — 96375 TX/PRO/DX INJ NEW DRUG ADDON: CPT

## 2025-01-18 PROCEDURE — 81001 URINALYSIS AUTO W/SCOPE: CPT

## 2025-01-18 PROCEDURE — 99291 CRITICAL CARE FIRST HOUR: CPT

## 2025-01-18 PROCEDURE — 87086 URINE CULTURE/COLONY COUNT: CPT

## 2025-01-18 RX ORDER — 0.9 % SODIUM CHLORIDE 0.9 %
1000 INTRAVENOUS SOLUTION INTRAVENOUS ONCE
Status: COMPLETED | OUTPATIENT
Start: 2025-01-18 | End: 2025-01-18

## 2025-01-18 RX ORDER — 0.9 % SODIUM CHLORIDE 0.9 %
1400 INTRAVENOUS SOLUTION INTRAVENOUS ONCE
Status: COMPLETED | OUTPATIENT
Start: 2025-01-18 | End: 2025-01-18

## 2025-01-18 RX ORDER — ACETAMINOPHEN 500 MG
1000 TABLET ORAL ONCE
Status: COMPLETED | OUTPATIENT
Start: 2025-01-18 | End: 2025-01-18

## 2025-01-18 RX ADMIN — SODIUM CHLORIDE 1000 ML: 9 INJECTION, SOLUTION INTRAVENOUS at 21:51

## 2025-01-18 RX ADMIN — AZITHROMYCIN MONOHYDRATE 500 MG: 500 INJECTION, POWDER, LYOPHILIZED, FOR SOLUTION INTRAVENOUS at 22:24

## 2025-01-18 RX ADMIN — ACETAMINOPHEN 1000 MG: 500 TABLET ORAL at 22:00

## 2025-01-18 RX ADMIN — WATER 1000 MG: 1 INJECTION INTRAMUSCULAR; INTRAVENOUS; SUBCUTANEOUS at 22:24

## 2025-01-18 RX ADMIN — HUMAN INSULIN 10 UNITS: 100 INJECTION, SOLUTION SUBCUTANEOUS at 22:53

## 2025-01-18 RX ADMIN — SODIUM CHLORIDE 1400 ML: 9 INJECTION, SOLUTION INTRAVENOUS at 22:23

## 2025-01-18 ASSESSMENT — PAIN - FUNCTIONAL ASSESSMENT: PAIN_FUNCTIONAL_ASSESSMENT: 0-10

## 2025-01-18 ASSESSMENT — LIFESTYLE VARIABLES
HOW OFTEN DO YOU HAVE A DRINK CONTAINING ALCOHOL: NEVER
HOW MANY STANDARD DRINKS CONTAINING ALCOHOL DO YOU HAVE ON A TYPICAL DAY: PATIENT DOES NOT DRINK

## 2025-01-19 LAB
ALBUMIN SERPL-MCNC: 2.9 G/DL (ref 3.4–5)
ALP SERPL-CCNC: 85 U/L (ref 40–129)
ALT SERPL-CCNC: 49 U/L (ref 10–40)
ANION GAP SERPL CALCULATED.3IONS-SCNC: 12 MMOL/L (ref 3–16)
APTT BLD: 36.3 SEC (ref 22.1–36.4)
AST SERPL-CCNC: 63 U/L (ref 15–37)
BASE EXCESS BLDV CALC-SCNC: 0.3 MMOL/L
BASOPHILS # BLD: 0.2 K/UL (ref 0–0.2)
BASOPHILS # BLD: 0.3 K/UL (ref 0–0.2)
BASOPHILS NFR BLD: 1.3 %
BASOPHILS NFR BLD: 2.1 %
BILIRUB DIRECT SERPL-MCNC: 0.2 MG/DL (ref 0–0.3)
BILIRUB INDIRECT SERPL-MCNC: 0.2 MG/DL (ref 0–1)
BILIRUB SERPL-MCNC: 0.4 MG/DL (ref 0–1)
BUN SERPL-MCNC: 38 MG/DL (ref 7–20)
CALCIUM SERPL-MCNC: 8.2 MG/DL (ref 8.3–10.6)
CHLORIDE SERPL-SCNC: 99 MMOL/L (ref 99–110)
CO2 BLDV-SCNC: 27 MMOL/L
CO2 SERPL-SCNC: 23 MMOL/L (ref 21–32)
COHGB MFR BLDV: 1.2 %
CREAT SERPL-MCNC: 1.7 MG/DL (ref 0.9–1.3)
DEPRECATED RDW RBC AUTO: 15.4 % (ref 12.4–15.4)
DEPRECATED RDW RBC AUTO: 15.6 % (ref 12.4–15.4)
EKG ATRIAL RATE: 109 BPM
EKG DIAGNOSIS: NORMAL
EKG P AXIS: 51 DEGREES
EKG P-R INTERVAL: 130 MS
EKG Q-T INTERVAL: 312 MS
EKG QRS DURATION: 70 MS
EKG QTC CALCULATION (BAZETT): 420 MS
EKG R AXIS: 60 DEGREES
EKG T AXIS: 37 DEGREES
EKG VENTRICULAR RATE: 109 BPM
EOSINOPHIL # BLD: 0 K/UL (ref 0–0.6)
EOSINOPHIL # BLD: 0 K/UL (ref 0–0.6)
EOSINOPHIL NFR BLD: 0 %
EOSINOPHIL NFR BLD: 0.1 %
ETHANOLAMINE SERPL-MCNC: NORMAL MG/DL (ref 0–0.08)
GFR SERPLBLD CREATININE-BSD FMLA CKD-EPI: 47 ML/MIN/{1.73_M2}
GLUCOSE BLD-MCNC: 366 MG/DL (ref 70–99)
GLUCOSE BLD-MCNC: 414 MG/DL (ref 70–99)
GLUCOSE BLD-MCNC: 440 MG/DL (ref 70–99)
GLUCOSE BLD-MCNC: 441 MG/DL (ref 70–99)
GLUCOSE BLD-MCNC: 464 MG/DL (ref 70–99)
GLUCOSE BLD-MCNC: 501 MG/DL (ref 70–99)
GLUCOSE SERPL-MCNC: 469 MG/DL (ref 70–99)
HCO3 BLDV-SCNC: 26 MMOL/L (ref 23–29)
HCT VFR BLD AUTO: 30.3 % (ref 40.5–52.5)
HCT VFR BLD AUTO: 31.5 % (ref 40.5–52.5)
HGB BLD-MCNC: 9.6 G/DL (ref 13.5–17.5)
HGB BLD-MCNC: 9.9 G/DL (ref 13.5–17.5)
LACTATE BLDV-SCNC: 1.6 MMOL/L (ref 0.4–2)
LACTATE BLDV-SCNC: 1.7 MMOL/L (ref 0.4–2)
LACTATE BLDV-SCNC: 1.8 MMOL/L (ref 0.4–2)
LACTATE BLDV-SCNC: 1.9 MMOL/L (ref 0.4–2)
LYMPHOCYTES # BLD: 0.4 K/UL (ref 1–5.1)
LYMPHOCYTES # BLD: 1 K/UL (ref 1–5.1)
LYMPHOCYTES NFR BLD: 3.5 %
LYMPHOCYTES NFR BLD: 8.1 %
MCH RBC QN AUTO: 21.6 PG (ref 26–34)
MCH RBC QN AUTO: 21.7 PG (ref 26–34)
MCHC RBC AUTO-ENTMCNC: 31.3 G/DL (ref 31–36)
MCHC RBC AUTO-ENTMCNC: 31.7 G/DL (ref 31–36)
MCV RBC AUTO: 68.5 FL (ref 80–100)
MCV RBC AUTO: 68.8 FL (ref 80–100)
METHGB MFR BLDV: 0.4 %
MONOCYTES # BLD: 0.2 K/UL (ref 0–1.3)
MONOCYTES # BLD: 0.6 K/UL (ref 0–1.3)
MONOCYTES NFR BLD: 1.4 %
MONOCYTES NFR BLD: 4.4 %
NEUTROPHILS # BLD: 10.8 K/UL (ref 1.7–7.7)
NEUTROPHILS # BLD: 11.6 K/UL (ref 1.7–7.7)
NEUTROPHILS NFR BLD: 86.1 %
NEUTROPHILS NFR BLD: 93 %
O2 THERAPY: NORMAL
ORGANISM: ABNORMAL
PATH INTERP BLD-IMP: NO
PATH INTERP BLD-IMP: NO
PCO2 BLDV: 43.5 MMHG (ref 40–50)
PERFORMED ON: ABNORMAL
PH BLDV: 7.38 [PH] (ref 7.35–7.45)
PLATELET # BLD AUTO: 282 K/UL (ref 135–450)
PLATELET # BLD AUTO: 302 K/UL (ref 135–450)
PMV BLD AUTO: 10.3 FL (ref 5–10.5)
PMV BLD AUTO: 10.3 FL (ref 5–10.5)
PO2 BLDV: 53 MMHG
POTASSIUM SERPL-SCNC: 4 MMOL/L (ref 3.5–5.1)
PROT SERPL-MCNC: 7.2 G/DL (ref 6.4–8.2)
RBC # BLD AUTO: 4.42 M/UL (ref 4.2–5.9)
RBC # BLD AUTO: 4.58 M/UL (ref 4.2–5.9)
REASON FOR REJECTION: NORMAL
REJECTED TEST: NORMAL
REPORT: NORMAL
RESP PATH DNA+RNA PNL L RESP NAA+NON-PRB: ABNORMAL
SAO2 % BLDV: 84 %
SODIUM SERPL-SCNC: 134 MMOL/L (ref 136–145)
TROPONIN, HIGH SENSITIVITY: 30 NG/L (ref 0–22)
TROPONIN, HIGH SENSITIVITY: 31 NG/L (ref 0–22)
TROPONIN, HIGH SENSITIVITY: 32 NG/L (ref 0–22)
WBC # BLD AUTO: 12.5 K/UL (ref 4–11)
WBC # BLD AUTO: 12.6 K/UL (ref 4–11)

## 2025-01-19 PROCEDURE — 80048 BASIC METABOLIC PNL TOTAL CA: CPT

## 2025-01-19 PROCEDURE — 94150 VITAL CAPACITY TEST: CPT

## 2025-01-19 PROCEDURE — 2700000000 HC OXYGEN THERAPY PER DAY

## 2025-01-19 PROCEDURE — 85730 THROMBOPLASTIN TIME PARTIAL: CPT

## 2025-01-19 PROCEDURE — 36415 COLL VENOUS BLD VENIPUNCTURE: CPT

## 2025-01-19 PROCEDURE — 85025 COMPLETE CBC W/AUTO DIFF WBC: CPT

## 2025-01-19 PROCEDURE — 82803 BLOOD GASES ANY COMBINATION: CPT

## 2025-01-19 PROCEDURE — 6370000000 HC RX 637 (ALT 250 FOR IP): Performed by: HOSPITALIST

## 2025-01-19 PROCEDURE — 2060000000 HC ICU INTERMEDIATE R&B

## 2025-01-19 PROCEDURE — 6360000002 HC RX W HCPCS: Performed by: HOSPITALIST

## 2025-01-19 PROCEDURE — 5A0945A ASSISTANCE WITH RESPIRATORY VENTILATION, 24-96 CONSECUTIVE HOURS, HIGH NASAL FLOW/VELOCITY: ICD-10-PCS | Performed by: FAMILY MEDICINE

## 2025-01-19 PROCEDURE — 99223 1ST HOSP IP/OBS HIGH 75: CPT | Performed by: INTERNAL MEDICINE

## 2025-01-19 PROCEDURE — 83605 ASSAY OF LACTIC ACID: CPT

## 2025-01-19 PROCEDURE — 83036 HEMOGLOBIN GLYCOSYLATED A1C: CPT

## 2025-01-19 PROCEDURE — 6360000002 HC RX W HCPCS: Performed by: FAMILY MEDICINE

## 2025-01-19 PROCEDURE — 80076 HEPATIC FUNCTION PANEL: CPT

## 2025-01-19 PROCEDURE — 6370000000 HC RX 637 (ALT 250 FOR IP): Performed by: FAMILY MEDICINE

## 2025-01-19 PROCEDURE — 93010 ELECTROCARDIOGRAM REPORT: CPT | Performed by: INTERNAL MEDICINE

## 2025-01-19 PROCEDURE — 87633 RESP VIRUS 12-25 TARGETS: CPT

## 2025-01-19 PROCEDURE — 94640 AIRWAY INHALATION TREATMENT: CPT

## 2025-01-19 PROCEDURE — 82077 ASSAY SPEC XCP UR&BREATH IA: CPT

## 2025-01-19 PROCEDURE — 84484 ASSAY OF TROPONIN QUANT: CPT

## 2025-01-19 PROCEDURE — 87205 SMEAR GRAM STAIN: CPT

## 2025-01-19 PROCEDURE — 94761 N-INVAS EAR/PLS OXIMETRY MLT: CPT

## 2025-01-19 PROCEDURE — 2580000003 HC RX 258: Performed by: HOSPITALIST

## 2025-01-19 PROCEDURE — 87070 CULTURE OTHR SPECIMN AEROBIC: CPT

## 2025-01-19 PROCEDURE — 6360000002 HC RX W HCPCS: Performed by: INTERNAL MEDICINE

## 2025-01-19 RX ORDER — INSULIN GLARGINE 100 [IU]/ML
20 INJECTION, SOLUTION SUBCUTANEOUS NIGHTLY
Status: DISCONTINUED | OUTPATIENT
Start: 2025-01-19 | End: 2025-01-19 | Stop reason: DRUGHIGH

## 2025-01-19 RX ORDER — DEXTROSE MONOHYDRATE 100 MG/ML
INJECTION, SOLUTION INTRAVENOUS CONTINUOUS PRN
Status: DISCONTINUED | OUTPATIENT
Start: 2025-01-19 | End: 2025-01-23 | Stop reason: HOSPADM

## 2025-01-19 RX ORDER — HEPARIN SODIUM 1000 [USP'U]/ML
80 INJECTION, SOLUTION INTRAVENOUS; SUBCUTANEOUS ONCE
Status: DISCONTINUED | OUTPATIENT
Start: 2025-01-19 | End: 2025-01-19

## 2025-01-19 RX ORDER — POTASSIUM CHLORIDE 1500 MG/1
40 TABLET, EXTENDED RELEASE ORAL PRN
Status: DISCONTINUED | OUTPATIENT
Start: 2025-01-19 | End: 2025-01-23 | Stop reason: HOSPADM

## 2025-01-19 RX ORDER — ENOXAPARIN SODIUM 100 MG/ML
40 INJECTION SUBCUTANEOUS DAILY
Status: DISCONTINUED | OUTPATIENT
Start: 2025-01-19 | End: 2025-01-23 | Stop reason: HOSPADM

## 2025-01-19 RX ORDER — ONDANSETRON 2 MG/ML
4 INJECTION INTRAMUSCULAR; INTRAVENOUS EVERY 6 HOURS PRN
Status: DISCONTINUED | OUTPATIENT
Start: 2025-01-19 | End: 2025-01-23 | Stop reason: HOSPADM

## 2025-01-19 RX ORDER — MAGNESIUM SULFATE IN WATER 40 MG/ML
2000 INJECTION, SOLUTION INTRAVENOUS PRN
Status: DISCONTINUED | OUTPATIENT
Start: 2025-01-19 | End: 2025-01-23 | Stop reason: HOSPADM

## 2025-01-19 RX ORDER — LISINOPRIL AND HYDROCHLOROTHIAZIDE 20; 25 MG/1; MG/1
1 TABLET ORAL DAILY
Status: ON HOLD | COMMUNITY
Start: 2024-09-16 | End: 2025-01-19

## 2025-01-19 RX ORDER — HEPARIN SODIUM 10000 [USP'U]/100ML
0-4000 INJECTION, SOLUTION INTRAVENOUS CONTINUOUS
Status: DISCONTINUED | OUTPATIENT
Start: 2025-01-19 | End: 2025-01-19

## 2025-01-19 RX ORDER — NIFEDIPINE 90 MG/1
90 TABLET, FILM COATED, EXTENDED RELEASE ORAL DAILY
COMMUNITY

## 2025-01-19 RX ORDER — POLYETHYLENE GLYCOL 3350 17 G/17G
17 POWDER, FOR SOLUTION ORAL DAILY PRN
Status: DISCONTINUED | OUTPATIENT
Start: 2025-01-19 | End: 2025-01-23 | Stop reason: HOSPADM

## 2025-01-19 RX ORDER — IPRATROPIUM BROMIDE AND ALBUTEROL SULFATE 2.5; .5 MG/3ML; MG/3ML
1 SOLUTION RESPIRATORY (INHALATION)
Status: DISCONTINUED | OUTPATIENT
Start: 2025-01-19 | End: 2025-01-20

## 2025-01-19 RX ORDER — INSULIN LISPRO 100 [IU]/ML
0-8 INJECTION, SOLUTION INTRAVENOUS; SUBCUTANEOUS
Status: DISCONTINUED | OUTPATIENT
Start: 2025-01-19 | End: 2025-01-19 | Stop reason: ALTCHOICE

## 2025-01-19 RX ORDER — BUDESONIDE 0.5 MG/2ML
0.5 INHALANT ORAL
Status: DISCONTINUED | OUTPATIENT
Start: 2025-01-19 | End: 2025-01-23 | Stop reason: HOSPADM

## 2025-01-19 RX ORDER — SODIUM CHLORIDE 9 MG/ML
INJECTION, SOLUTION INTRAVENOUS PRN
Status: DISCONTINUED | OUTPATIENT
Start: 2025-01-19 | End: 2025-01-23 | Stop reason: HOSPADM

## 2025-01-19 RX ORDER — METHYLPREDNISOLONE SODIUM SUCCINATE 125 MG/2ML
60 INJECTION INTRAMUSCULAR; INTRAVENOUS DAILY
Status: DISCONTINUED | OUTPATIENT
Start: 2025-01-19 | End: 2025-01-21

## 2025-01-19 RX ORDER — ENOXAPARIN SODIUM 100 MG/ML
30 INJECTION SUBCUTANEOUS 2 TIMES DAILY
Status: DISCONTINUED | OUTPATIENT
Start: 2025-01-19 | End: 2025-01-19

## 2025-01-19 RX ORDER — HEPARIN SODIUM 1000 [USP'U]/ML
80 INJECTION, SOLUTION INTRAVENOUS; SUBCUTANEOUS PRN
Status: DISCONTINUED | OUTPATIENT
Start: 2025-01-19 | End: 2025-01-19

## 2025-01-19 RX ORDER — INSULIN GLARGINE 100 [IU]/ML
30 INJECTION, SOLUTION SUBCUTANEOUS DAILY
Status: DISCONTINUED | OUTPATIENT
Start: 2025-01-20 | End: 2025-01-21

## 2025-01-19 RX ORDER — INSULIN GLARGINE 100 [IU]/ML
10 INJECTION, SOLUTION SUBCUTANEOUS ONCE
Status: COMPLETED | OUTPATIENT
Start: 2025-01-19 | End: 2025-01-19

## 2025-01-19 RX ORDER — INSULIN GLARGINE 100 [IU]/ML
20 INJECTION, SOLUTION SUBCUTANEOUS ONCE
Status: COMPLETED | OUTPATIENT
Start: 2025-01-19 | End: 2025-01-19

## 2025-01-19 RX ORDER — SODIUM CHLORIDE 0.9 % (FLUSH) 0.9 %
5-40 SYRINGE (ML) INJECTION PRN
Status: DISCONTINUED | OUTPATIENT
Start: 2025-01-19 | End: 2025-01-23 | Stop reason: HOSPADM

## 2025-01-19 RX ORDER — INSULIN LISPRO 100 [IU]/ML
0-16 INJECTION, SOLUTION INTRAVENOUS; SUBCUTANEOUS
Status: DISCONTINUED | OUTPATIENT
Start: 2025-01-19 | End: 2025-01-23 | Stop reason: HOSPADM

## 2025-01-19 RX ORDER — ALBUTEROL SULFATE 0.83 MG/ML
2.5 SOLUTION RESPIRATORY (INHALATION)
Status: DISCONTINUED | OUTPATIENT
Start: 2025-01-19 | End: 2025-01-19

## 2025-01-19 RX ORDER — ACETAMINOPHEN 325 MG/1
650 TABLET ORAL EVERY 6 HOURS PRN
Status: DISCONTINUED | OUTPATIENT
Start: 2025-01-19 | End: 2025-01-23 | Stop reason: HOSPADM

## 2025-01-19 RX ORDER — ONDANSETRON 4 MG/1
4 TABLET, ORALLY DISINTEGRATING ORAL EVERY 8 HOURS PRN
Status: DISCONTINUED | OUTPATIENT
Start: 2025-01-19 | End: 2025-01-23 | Stop reason: HOSPADM

## 2025-01-19 RX ORDER — HEPARIN SODIUM 1000 [USP'U]/ML
4100 INJECTION, SOLUTION INTRAVENOUS; SUBCUTANEOUS ONCE
Status: COMPLETED | OUTPATIENT
Start: 2025-01-19 | End: 2025-01-19

## 2025-01-19 RX ORDER — GLUCAGON 1 MG/ML
1 KIT INJECTION PRN
Status: DISCONTINUED | OUTPATIENT
Start: 2025-01-19 | End: 2025-01-23 | Stop reason: HOSPADM

## 2025-01-19 RX ORDER — SODIUM CHLORIDE 0.9 % (FLUSH) 0.9 %
5-40 SYRINGE (ML) INJECTION EVERY 12 HOURS SCHEDULED
Status: DISCONTINUED | OUTPATIENT
Start: 2025-01-19 | End: 2025-01-23 | Stop reason: HOSPADM

## 2025-01-19 RX ORDER — GUAIFENESIN 600 MG/1
600 TABLET, EXTENDED RELEASE ORAL 2 TIMES DAILY
Status: DISCONTINUED | OUTPATIENT
Start: 2025-01-19 | End: 2025-01-23 | Stop reason: HOSPADM

## 2025-01-19 RX ORDER — SODIUM CHLORIDE 9 MG/ML
INJECTION, SOLUTION INTRAVENOUS CONTINUOUS
Status: ACTIVE | OUTPATIENT
Start: 2025-01-19 | End: 2025-01-20

## 2025-01-19 RX ORDER — POTASSIUM CHLORIDE 7.45 MG/ML
10 INJECTION INTRAVENOUS PRN
Status: DISCONTINUED | OUTPATIENT
Start: 2025-01-19 | End: 2025-01-23 | Stop reason: HOSPADM

## 2025-01-19 RX ORDER — HEPARIN SODIUM 1000 [USP'U]/ML
40 INJECTION, SOLUTION INTRAVENOUS; SUBCUTANEOUS PRN
Status: DISCONTINUED | OUTPATIENT
Start: 2025-01-19 | End: 2025-01-19

## 2025-01-19 RX ORDER — ACETAMINOPHEN 650 MG/1
650 SUPPOSITORY RECTAL EVERY 6 HOURS PRN
Status: DISCONTINUED | OUTPATIENT
Start: 2025-01-19 | End: 2025-01-23 | Stop reason: HOSPADM

## 2025-01-19 RX ADMIN — INSULIN LISPRO 8 UNITS: 100 INJECTION, SOLUTION INTRAVENOUS; SUBCUTANEOUS at 09:22

## 2025-01-19 RX ADMIN — INSULIN GLARGINE 20 UNITS: 100 INJECTION, SOLUTION SUBCUTANEOUS at 09:22

## 2025-01-19 RX ADMIN — HEPARIN SODIUM 4100 UNITS: 1000 INJECTION INTRAVENOUS; SUBCUTANEOUS at 07:30

## 2025-01-19 RX ADMIN — SODIUM CHLORIDE: 9 INJECTION, SOLUTION INTRAVENOUS at 01:37

## 2025-01-19 RX ADMIN — CEFTRIAXONE SODIUM 2000 MG: 2 INJECTION, POWDER, FOR SOLUTION INTRAMUSCULAR; INTRAVENOUS at 13:52

## 2025-01-19 RX ADMIN — ENOXAPARIN SODIUM 40 MG: 100 INJECTION SUBCUTANEOUS at 18:15

## 2025-01-19 RX ADMIN — AZITHROMYCIN MONOHYDRATE 500 MG: 500 INJECTION, POWDER, LYOPHILIZED, FOR SOLUTION INTRAVENOUS at 20:50

## 2025-01-19 RX ADMIN — METHYLPREDNISOLONE SODIUM SUCCINATE 60 MG: 125 INJECTION INTRAMUSCULAR; INTRAVENOUS at 12:32

## 2025-01-19 RX ADMIN — GUAIFENESIN 600 MG: 600 TABLET ORAL at 20:46

## 2025-01-19 RX ADMIN — BUDESONIDE INHALATION 500 MCG: 0.5 SUSPENSION RESPIRATORY (INHALATION) at 19:24

## 2025-01-19 RX ADMIN — GUAIFENESIN 600 MG: 600 TABLET ORAL at 12:32

## 2025-01-19 RX ADMIN — INSULIN LISPRO 16 UNITS: 100 INJECTION, SOLUTION INTRAVENOUS; SUBCUTANEOUS at 18:15

## 2025-01-19 RX ADMIN — IPRATROPIUM BROMIDE AND ALBUTEROL SULFATE 1 DOSE: 2.5; .5 SOLUTION RESPIRATORY (INHALATION) at 19:24

## 2025-01-19 RX ADMIN — INSULIN GLARGINE 10 UNITS: 100 INJECTION, SOLUTION SUBCUTANEOUS at 18:15

## 2025-01-19 RX ADMIN — HEPARIN SODIUM 1850 UNITS/HR: 10000 INJECTION, SOLUTION INTRAVENOUS at 07:33

## 2025-01-19 RX ADMIN — INSULIN LISPRO 8 UNITS: 100 INJECTION, SOLUTION INTRAVENOUS; SUBCUTANEOUS at 01:55

## 2025-01-19 RX ADMIN — BUDESONIDE INHALATION 500 MCG: 0.5 SUSPENSION RESPIRATORY (INHALATION) at 12:45

## 2025-01-19 RX ADMIN — IPRATROPIUM BROMIDE AND ALBUTEROL SULFATE 1 DOSE: 2.5; .5 SOLUTION RESPIRATORY (INHALATION) at 16:10

## 2025-01-19 RX ADMIN — INSULIN LISPRO 16 UNITS: 100 INJECTION, SOLUTION INTRAVENOUS; SUBCUTANEOUS at 20:46

## 2025-01-19 RX ADMIN — INSULIN LISPRO 8 UNITS: 100 INJECTION, SOLUTION INTRAVENOUS; SUBCUTANEOUS at 12:32

## 2025-01-19 RX ADMIN — ALBUTEROL SULFATE 2.5 MG: 2.5 SOLUTION RESPIRATORY (INHALATION) at 13:17

## 2025-01-19 RX ADMIN — ENOXAPARIN SODIUM 30 MG: 100 INJECTION SUBCUTANEOUS at 01:56

## 2025-01-19 ASSESSMENT — PAIN SCALES - GENERAL: PAINLEVEL_OUTOF10: 0

## 2025-01-19 NOTE — ED NOTES
Verbal report and required paper work to Hernandez with [facility].  All questions answered at this time and care assumed by [facility].  Patient loaded onto EMS cot and moved to ambulance for transport to receiving facility.

## 2025-01-19 NOTE — CONSULTS
REASON FOR CONSULTATION/CC: Pneumonia      Consult at request of Jordy Heath MD     PCP: No primary care provider on file.  Established Pulmonologist:  None    Chief Complaint   Patient presents with    Blood Sugar Problem     Hyperglycemia with confusion, tachycardia, tachypnea.  Had been non-compliant with medications until 1 week ago.    Fever     Fever of unspecified cause, family reports no known sick contacts.       HISTORY OF PRESENT ILLNESS: Srini Recio is a 54 y.o. year old male with a history of hypertension, DMII who presents with fever and confusion.  Patient came to the ER yesterday.  Chest imaging consistent with large right upper lobe pneumonia.  Patient states that he is feeling somewhat better since arrival.  He is on supplemental oxygen which is new for him.  He does have an associated cough but while was initially productive has now improved.  He denies prodromal illnesses.      Past Medical History:   Diagnosis Date    Hypertension     Type 2 diabetes mellitus (HCC)          Past Surgical History:   Procedure Laterality Date    HERNIA REPAIR      at birth    TOE AMPUTATION Right 6/19/2024    AMPUTATION DISTAL RIGHT SECOND TOE performed by Beto Hassan DPM at [facility] OR       Family Hx  family history includes Alcohol Abuse in his father.    Social Hx   reports that he has never smoked. He has never used smokeless tobacco.    Scheduled Meds:   insulin glargine  20 Units SubCUTAneous Nightly    insulin lispro  0-8 Units SubCUTAneous 4x Daily AC & HS    cefTRIAXone (ROCEPHIN) IV  2,000 mg IntraVENous Q24H    azithromycin  500 mg IntraVENous Q24H    sodium chloride flush  5-40 mL IntraVENous 2 times per day    albuterol  2.5 mg Nebulization Q4H RT    ipratropium  0.5 mg Nebulization Q4H RT    budesonide  0.5 mg Nebulization BID RT    guaiFENesin  600 mg Oral BID    enoxaparin  40 mg SubCUTAneous Daily       Continuous Infusions:   sodium chloride      sodium chloride 125 mL/hr at 01/19/25

## 2025-01-19 NOTE — ED TRIAGE NOTES
Patient to ED with spouse for blood sugar problems.  Patient is alert but confused with GCS 14.  Spouse reports that patient has felt unwell x 1 week, had been non-compliant with DM medications until 1 week ago.  Patient complains of generalized weakness, dizziness, polyuria, polydipsia for the past few days. Patient noted to have unsteady gait when moving from wheel chair to bed.  Patient is alert to person and time, but not completley aware of surroundings.  BGL reads \"HI\" when assessing during triage.    Patient placed on cardiac monitor showing ST, has increased respiratory rate, and is febrile at this time.  Spouse denies any recent travel or known sick exposure.

## 2025-01-19 NOTE — ED NOTES
Call placed to House Supervisor to discuss level of acuity of patient and placement.  Advised that there are both PCU and ICU beds and patient can be moved if necessary.

## 2025-01-19 NOTE — ED NOTES
Patient has had decrease in SaO2.  Patient found to be asleep, obvious snoring respirations, but patient easily arousable and able to follow commands.  Patient is primarily mouth breathing and gaining much benefit from NC at 3 lpm.  Patient denies hx of sleep apnea or use of CPAP/BiPAP at home.  Patient transitioned to simple face mask and titrated to 7 lpm to maintain SaO2 above 90%.  Patient is tolerating well.

## 2025-01-19 NOTE — PROGRESS NOTES
Pharmacy Medication Reconciliation Note     List of medications patient is currently taking is complete.    Source of information:   1. Discussion with patient at bedside  2. Epic records (dispense report)    Notes regarding home medications:   1. Medication list up to date-confirmed doses of lantus 20 units nightly. States he also takes humalog 20 units TID AC (although smaller dose if he does not eat a big meal).     Dea Anderson, PharmD, BCPS  1/19/2025 12:13 PM

## 2025-01-19 NOTE — H&P
5.90 M/uL    Hemoglobin 10.7 (L) 13.5 - 17.5 g/dL    Hematocrit 33.7 (L) 40.5 - 52.5 %    MCV 69.7 (L) 80.0 - 100.0 fL    MCH 22.1 (L) 26.0 - 34.0 pg    MCHC 31.6 31.0 - 36.0 g/dL    RDW 15.6 (H) 12.4 - 15.4 %    Platelets 278 135 - 450 K/uL    MPV 10.6 (H) 5.0 - 10.5 fL    PLATELET SLIDE REVIEW Adequate     SLIDE REVIEW see below     Path Consult No     Neutrophils % 89.6 %    Lymphocytes % 4.4 %    Monocytes % 4.9 %    Eosinophils % 0.1 %    Basophils % 1.0 %    Neutrophils Absolute 11.7 (H) 1.7 - 7.7 K/uL    Lymphocytes Absolute 0.6 (L) 1.0 - 5.1 K/uL    Monocytes Absolute 0.6 0.0 - 1.3 K/uL    Eosinophils Absolute 0.0 0.0 - 0.6 K/uL    Basophils Absolute 0.1 0.0 - 0.2 K/uL   CMP w/ Reflex to MG    Collection Time: 01/18/25  9:36 PM   Result Value Ref Range    Sodium 126 (L) 136 - 145 mmol/L    Potassium reflex Magnesium 4.4 3.5 - 5.1 mmol/L    Chloride 87 (L) 99 - 110 mmol/L    CO2 21 21 - 32 mmol/L    Anion Gap 18 (H) 3 - 16    Glucose 639 (HH) 70 - 99 mg/dL    BUN 37 (H) 7 - 20 mg/dL    Creatinine 2.1 (H) 0.9 - 1.3 mg/dL    Est, Glom Filt Rate 36 (A) >60    Calcium 8.1 (L) 8.3 - 10.6 mg/dL    Total Protein 6.9 6.4 - 8.2 g/dL    Albumin 2.8 (L) 3.4 - 5.0 g/dL    Albumin/Globulin Ratio 0.7 (L) 1.1 - 2.2    Total Bilirubin 0.5 0.0 - 1.0 mg/dL    Alkaline Phosphatase 77 40 - 129 U/L    ALT 47 (H) 10 - 40 U/L    AST 65 (H) 15 - 37 U/L   Lactate, Sepsis    Collection Time: 01/18/25  9:36 PM   Result Value Ref Range    Lactic Acid, Sepsis 3.5 (H) 0.4 - 1.9 mmol/L   Blood Gas, Venous    Collection Time: 01/18/25  9:36 PM   Result Value Ref Range    pH, Horace 7.462 (H) 7.350 - 7.450    pCO2, Horace 30.1 (L) 40.0 - 50.0 mmHg    PO2, Horace 51.8 (H) 25.0 - 40.0 mmHg    HCO3, Venous 21.5 (L) 23.0 - 29.0 mmol/L    Base Excess, Horace -2.3 -3.0 - 3.0 mmol/L    O2 Sat, Horace 88 Not Established %    TC02 (Calc), Horace 20 Not Established mmol/L    O2 Therapy Unknown    Beta-Hydroxybutyrate    Collection Time: 01/18/25  9:36 PM   Result Value

## 2025-01-19 NOTE — PROGRESS NOTES
Clinical Pharmacy Note  Heparin Dosing Consult    Srini Recio is a 54 y.o. male ordered heparin per VTE/DVT/PE Nomogram by Dr. Guillen.     No results found for: \"APTT\"  Lab Results   Component Value Date/Time    HGB 10.7 01/18/2025 09:36 PM    HCT 33.7 01/18/2025 09:36 PM     01/18/2025 09:36 PM       Ht Readings from Last 1 Encounters:   01/18/25 1.854 m (6' 1\")        Wt Readings from Last 1 Encounters:   01/18/25 103 kg (227 lb 1.2 oz)        Assessment/Plan:  Initial bolus: 4100 units - giving a half bolus due to patient receiving 30mg lovenox at 0156 today - confirmed via telephone with Dr. Guillen  Initial infusion rate: 1850 units/hr  Next anti-Xa: 1300 01/19/25    Pharmacy will continue to monitor adjust heparin based on anti-Xa results using nomogram below:     VTE/DVT/PE Heparin Nomogram     Initial Bolus: 80 units/kg Max Bolus: 10,000 units       Initial Rate: 18 units/kg/hr Max Initial Rate: 2,100 units/hr     anti-Xa Bolus Titration   < 0.1 Heparin 80 units/kg bolus Increase drip by 4 units/kg/hr   0.1 - 0.29 Heparin 40 units/kg bolus Increase drip by 2 units/kg/hr   0.3 - 0.7 No Bolus No Change   0.71 - 0.8 No Bolus Decrease drip by 1 units/kg/hr   0.81 - 0.99 No Bolus Decrease drip by 2 units/kg/hr   > 1 Hold Heparin for 1 hour Decrease drip by 3 units/kg/hr       Obtain anti-Xa 6 hours after initial bolus and 6 hours after any dose change until two consecutive therapeutic anti-Xa levels are achieved - then daily.    Lesley Saleem, JavierD

## 2025-01-19 NOTE — PROGRESS NOTES
V2.0    [facility] [place] [facility] [place]      Name:  Srini Recio /Age/Sex: 1970  (54 y.o. male)   MRN & CSN:  [id number] & [id number] Encounter Date/Time: 2025 10:49 AM EST   Location:  [id number]-1431/5121-01 PCP: No primary care provider on file.     Attending:Jordy Heath MD       Hospital Day: 2    Assessment and Recommendations   Srini Recio is a 54 y.o. male with pmh of hypertension, type 2 diabetes who presents with Sepsis (HCC)    Patient was examined this morning.  Patient was on nonrebreather throughout the night, currently is on 15 L nasal cannula  Will trial patient with DuoNeb breathing treatments, budesonide breathing treatments  Incentive spirometer every hour while awake  Continues to be on azithromycin and Rocephin for his pneumonia  Will consult pulmonology for further recommendations of any    Patient was started on heparin drip due to troponin elevation at 32.  Most likely troponin is elevated due to respiratory demand, MI type II.  Troponin is flat at 32, 31, 30 there is no sign of i cardiac ischemia        Plan:   Sepsis  Patient met sepsis criteria on presentation due to most likely right upper lobe pneumonia  IV fluid with normal saline 30 mg/kg  Lactic acid is within normal limits this morning.    2.  Pneumonia  Chest congestion, productive cough x 2-3 weeks  Chest x-ray shows right upper lobe pneumonia  Azithromycin 500 mg IV daily  Rocephin 1 g IV daily  DuoNeb breathing treatments  Budesonide breathing treatments  Incentive spirometer    3.  Acute respiratory failure  Most likely due to pneumonia  Was on nonrebreather on presentation switched to nasal cannula  Will consult pulmonology for their recommendations    4.  Elevated troponin  Most likely due to increasing demand MI type II  Troponins flat at 32, 31, 30  Patient was started on heparin drip in the ED  Will discontinue heparin      5.  Diabetes mellitus type 2  Last globin A1c was obtained on 6/15/2024 to be 13.2  Will  Discharge Instructions       PATIENT ID: Le Tian  MRN: [id number]   YOB: 1970    DATE OF ADMISSION: 2/8/2019  9:58 PM    DATE OF DISCHARGE: 2/10/2019  PRIMARY CARE PROVIDER: Koby Gutierrez MD   ATTENDING PHYSICIAN: James Klein MD  DISCHARGING PROVIDER: Alin Sutherland NP. To contact this individual call [phone] and ask the  to page. If unavailable ask to be transferred the Adult Hospitalist Department. DISCHARGE DIAGNOSES   Possible Migraine    CONSULTATIONS: IP CONSULT TO HOSPITALIST  IP CONSULT TO NEUROLOGY    FOLLOW UP APPOINTMENTS:   Follow-up Information     Follow up With Specialties Details Why Contact Info    Koby Gutierrez MD Family Practice In 2 weeks post hospital check up [address] [facility]  [phone]          ADDITIONAL CARE RECOMMENDATIONS:   - start daily baby asa    - Triglycerides elevated - follow up with your PCP for recheck     DIET: Regular Diet    ACTIVITY: Activity as tolerated    · It is important that you take the medication exactly as they are prescribed. · Keep your medication in the bottles provided by the pharmacist and keep a list of the medication names, dosages, and times to be taken in your wallet. · Do not take other medications without consulting your doctor. NOTIFY YOUR PHYSICIAN FOR ANY OF THE FOLLOWING:   Fever over 101 degrees for 24 hours. Chest pain, shortness of breath, fever, chills, nausea, vomiting, diarrhea, change in mentation, falling, weakness, bleeding. Severe pain or pain not relieved by medications. Or, any other signs or symptoms that you may have questions about.     DISPOSITION:    Home With:   OT  PT  HH  RN       SNF/Inpatient Rehab/LTAC    Independent/assisted living    Hospice   xx Other: Home     CDMP Checked:   Yes *x*     PROBLEM LIST Updated:  Yes *x*     Signed:   Alin Sutherland NP  2/10/2019  7:06 AM

## 2025-01-19 NOTE — ED PROVIDER NOTES
(ROCEPHIN) 1,000 mg in sterile water 10 mL IV syringe (1,000 mg IntraVENous Given 1/18/25 2224)     And   azithromycin (ZITHROMAX) 500 mg in 250 mL addavial (0 mg IntraVENous Stopped 1/18/25 2329)   insulin regular (HumuLIN R;NovoLIN R) injection 10 Units (10 Units IntraVENous Given 1/18/25 2253)        CONSULTS:   IP CONSULT TO HOSPITALIST   Discussion with Other Professionals: Hospitalist-Dr. Guillen  Social Determinants: None   Chronic Conditions:   has a past medical history of Hypertension and Type 2 diabetes mellitus (HCC).    Records Reviewed: PMD office visit note on 9/23/2024 for benign essential hypertension, admission notes and discharge summary from 6/15/2024 for acute osteomyelitis of the right foot, uncontrolled hypertension, elevated D-dimer, medication noncompliance, type 2 diabetes with hyperglycemia      Disposition Considerations:    I am the Primary Clinician of Record.        FINAL IMPRESSION    1. Severe sepsis (HCC)    2. Community acquired pneumonia of right lung, unspecified part of lung    3. Acute respiratory failure with hypoxia    4. Type 2 diabetes mellitus with hyperglycemia, with long-term current use of insulin ([facility])    5. Confusion    6. PAT (acute kidney injury) ([facility])    7. Elevated troponin           DISPOSITION/PLAN:   DISPOSITION Admitted 01/18/2025 11:09:47 PM  Condition at Disposition: Data Unavailable      PATIENT REFERRED TO:   No follow-up provider specified.     DISCHARGE MEDICATIONS:   New Prescriptions    No medications on file        DISCONTINUED MEDICATIONS:   Discontinued Medications    No medications on file              (Please note that portions of this note were completed with a voice recognition program.  Efforts were made to edit the dictations but occasionally words are mis-transcribed.)       Lenny Moody MD (electronically signed)              Lenny Moody MD  01/19/25 0121

## 2025-01-19 NOTE — ED NOTES
Call placed to Charge RN of 5W to discuss patient and appropriateness of PCU level of service.  Per Charge patient is appropriate for that level of care.

## 2025-01-19 NOTE — ED NOTES
ED SBAR report provider to MELQUIADES Chambers. Patient to be transported to Room 5121 via stretcher by  [facility] . Transported on cot by EMS and Patient transported with bedside cardiac monitor. IV site clean, dry, and intact. Updated patient and family on plan of care.

## 2025-01-19 NOTE — PROGRESS NOTES
4 Eyes Skin Assessment     NAME:  Srini Recio  YOB: 1970  MEDICAL RECORD NUMBER:  [id number]    The patient is being assessed for  Admission    I agree that at least one RN has performed a thorough Head to Toe Skin Assessment on the patient. ALL assessment sites listed below have been assessed.      Areas assessed by both nurses:    Head, Face, Ears, Shoulders, Back, Chest, Arms, Elbows, Hands, Sacrum. Buttock, Coccyx, Ischium, and Legs. Feet and Heels        Does the Patient have a Wound? No noted wound(s)       Robe Prevention initiated by RN: Yes  Wound Care Orders initiated by RN: No    Pressure Injury (Stage 3,4, Unstageable, DTI, NWPT, and Complex wounds) if present, place Wound referral order by RN under : No    New Ostomies, if present place, Ostomy referral order under : No     Nurse 1 eSignature: Electronically signed by Trish Degroot RN on 1/19/25 at 2:35 AM EST    **SHARE this note so that the co-signing nurse can place an eSignature**    Nurse 2 eSignature: Electronically signed by Angeli Carty RN on 1/19/25 at 2:37 AM EST

## 2025-01-20 ENCOUNTER — APPOINTMENT (OUTPATIENT)
Dept: CT IMAGING | Age: 55
DRG: 871 | End: 2025-01-20

## 2025-01-20 LAB
ALBUMIN SERPL-MCNC: 2.8 G/DL (ref 3.4–5)
ALBUMIN/GLOB SERPL: 0.6 {RATIO} (ref 1.1–2.2)
ALP SERPL-CCNC: 86 U/L (ref 40–129)
ALT SERPL-CCNC: 45 U/L (ref 10–40)
ANION GAP SERPL CALCULATED.3IONS-SCNC: 13 MMOL/L (ref 3–16)
AST SERPL-CCNC: 50 U/L (ref 15–37)
BACTERIA UR CULT: NORMAL
BILIRUB SERPL-MCNC: 0.3 MG/DL (ref 0–1)
BUN SERPL-MCNC: 26 MG/DL (ref 7–20)
CALCIUM SERPL-MCNC: 8.1 MG/DL (ref 8.3–10.6)
CHLORIDE SERPL-SCNC: 100 MMOL/L (ref 99–110)
CO2 SERPL-SCNC: 23 MMOL/L (ref 21–32)
CREAT SERPL-MCNC: 1.2 MG/DL (ref 0.9–1.3)
DEPRECATED RDW RBC AUTO: 15.4 % (ref 12.4–15.4)
EST. AVERAGE GLUCOSE BLD GHB EST-MCNC: 306.3 MG/DL
GFR SERPLBLD CREATININE-BSD FMLA CKD-EPI: 71 ML/MIN/{1.73_M2}
GLUCOSE BLD-MCNC: 359 MG/DL (ref 70–99)
GLUCOSE BLD-MCNC: 371 MG/DL (ref 70–99)
GLUCOSE BLD-MCNC: 405 MG/DL (ref 70–99)
GLUCOSE BLD-MCNC: 417 MG/DL (ref 70–99)
GLUCOSE BLD-MCNC: 426 MG/DL (ref 70–99)
GLUCOSE SERPL-MCNC: 380 MG/DL (ref 70–99)
HBA1C MFR BLD: 12.3 %
HCT VFR BLD AUTO: 30.8 % (ref 40.5–52.5)
HGB BLD-MCNC: 9.9 G/DL (ref 13.5–17.5)
MCH RBC QN AUTO: 21.7 PG (ref 26–34)
MCHC RBC AUTO-ENTMCNC: 32.1 G/DL (ref 31–36)
MCV RBC AUTO: 67.6 FL (ref 80–100)
PATH INTERP BLD-IMP: NO
PERFORMED ON: ABNORMAL
PLATELET # BLD AUTO: 362 K/UL (ref 135–450)
PMV BLD AUTO: 10.2 FL (ref 5–10.5)
POTASSIUM SERPL-SCNC: 4.3 MMOL/L (ref 3.5–5.1)
PROT SERPL-MCNC: 7.2 G/DL (ref 6.4–8.2)
RBC # BLD AUTO: 4.56 M/UL (ref 4.2–5.9)
SODIUM SERPL-SCNC: 136 MMOL/L (ref 136–145)
WBC # BLD AUTO: 18.7 K/UL (ref 4–11)

## 2025-01-20 PROCEDURE — 85027 COMPLETE CBC AUTOMATED: CPT

## 2025-01-20 PROCEDURE — 2060000000 HC ICU INTERMEDIATE R&B

## 2025-01-20 PROCEDURE — 2500000003 HC RX 250 WO HCPCS: Performed by: HOSPITALIST

## 2025-01-20 PROCEDURE — 6360000002 HC RX W HCPCS: Performed by: FAMILY MEDICINE

## 2025-01-20 PROCEDURE — 94640 AIRWAY INHALATION TREATMENT: CPT

## 2025-01-20 PROCEDURE — 2700000000 HC OXYGEN THERAPY PER DAY

## 2025-01-20 PROCEDURE — 71260 CT THORAX DX C+: CPT

## 2025-01-20 PROCEDURE — 6360000002 HC RX W HCPCS: Performed by: INTERNAL MEDICINE

## 2025-01-20 PROCEDURE — 6370000000 HC RX 637 (ALT 250 FOR IP): Performed by: FAMILY MEDICINE

## 2025-01-20 PROCEDURE — 92610 EVALUATE SWALLOWING FUNCTION: CPT

## 2025-01-20 PROCEDURE — 6360000004 HC RX CONTRAST MEDICATION: Performed by: FAMILY MEDICINE

## 2025-01-20 PROCEDURE — 94761 N-INVAS EAR/PLS OXIMETRY MLT: CPT

## 2025-01-20 PROCEDURE — 80053 COMPREHEN METABOLIC PANEL: CPT

## 2025-01-20 PROCEDURE — 36415 COLL VENOUS BLD VENIPUNCTURE: CPT

## 2025-01-20 RX ORDER — LEVOFLOXACIN 5 MG/ML
750 INJECTION, SOLUTION INTRAVENOUS EVERY 24 HOURS
Status: DISCONTINUED | OUTPATIENT
Start: 2025-01-20 | End: 2025-01-20 | Stop reason: RX

## 2025-01-20 RX ORDER — LEVOFLOXACIN 5 MG/ML
500 INJECTION, SOLUTION INTRAVENOUS EVERY 24 HOURS
Status: DISCONTINUED | OUTPATIENT
Start: 2025-01-20 | End: 2025-01-23 | Stop reason: HOSPADM

## 2025-01-20 RX ORDER — PANTOPRAZOLE SODIUM 40 MG/1
40 TABLET, DELAYED RELEASE ORAL
Status: DISCONTINUED | OUTPATIENT
Start: 2025-01-21 | End: 2025-01-23 | Stop reason: HOSPADM

## 2025-01-20 RX ORDER — INSULIN GLARGINE 100 [IU]/ML
5 INJECTION, SOLUTION SUBCUTANEOUS ONCE
Status: DISCONTINUED | OUTPATIENT
Start: 2025-01-20 | End: 2025-01-20 | Stop reason: SDUPTHER

## 2025-01-20 RX ORDER — INSULIN GLARGINE 100 [IU]/ML
10 INJECTION, SOLUTION SUBCUTANEOUS ONCE
Status: DISCONTINUED | OUTPATIENT
Start: 2025-01-20 | End: 2025-01-20

## 2025-01-20 RX ORDER — LEVOFLOXACIN 5 MG/ML
250 INJECTION, SOLUTION INTRAVENOUS EVERY 24 HOURS
Status: DISCONTINUED | OUTPATIENT
Start: 2025-01-20 | End: 2025-01-23 | Stop reason: HOSPADM

## 2025-01-20 RX ORDER — IPRATROPIUM BROMIDE AND ALBUTEROL SULFATE 2.5; .5 MG/3ML; MG/3ML
1 SOLUTION RESPIRATORY (INHALATION)
Status: DISCONTINUED | OUTPATIENT
Start: 2025-01-21 | End: 2025-01-23 | Stop reason: HOSPADM

## 2025-01-20 RX ORDER — ALBUTEROL SULFATE 90 UG/1
2 INHALANT RESPIRATORY (INHALATION) EVERY 4 HOURS PRN
Status: DISCONTINUED | OUTPATIENT
Start: 2025-01-20 | End: 2025-01-23 | Stop reason: HOSPADM

## 2025-01-20 RX ORDER — IOPAMIDOL 755 MG/ML
75 INJECTION, SOLUTION INTRAVASCULAR
Status: COMPLETED | OUTPATIENT
Start: 2025-01-20 | End: 2025-01-20

## 2025-01-20 RX ORDER — INSULIN GLARGINE 100 [IU]/ML
5 INJECTION, SOLUTION SUBCUTANEOUS ONCE
Status: COMPLETED | OUTPATIENT
Start: 2025-01-20 | End: 2025-01-20

## 2025-01-20 RX ADMIN — SODIUM CHLORIDE, PRESERVATIVE FREE 10 ML: 5 INJECTION INTRAVENOUS at 08:58

## 2025-01-20 RX ADMIN — SODIUM CHLORIDE, PRESERVATIVE FREE 10 ML: 5 INJECTION INTRAVENOUS at 20:44

## 2025-01-20 RX ADMIN — INSULIN LISPRO 16 UNITS: 100 INJECTION, SOLUTION INTRAVENOUS; SUBCUTANEOUS at 12:53

## 2025-01-20 RX ADMIN — LEVOFLOXACIN 500 MG: 5 INJECTION, SOLUTION INTRAVENOUS at 16:59

## 2025-01-20 RX ADMIN — IPRATROPIUM BROMIDE AND ALBUTEROL SULFATE 1 DOSE: 2.5; .5 SOLUTION RESPIRATORY (INHALATION) at 20:18

## 2025-01-20 RX ADMIN — ENOXAPARIN SODIUM 40 MG: 100 INJECTION SUBCUTANEOUS at 08:52

## 2025-01-20 RX ADMIN — LEVOFLOXACIN 250 MG: 5 INJECTION, SOLUTION INTRAVENOUS at 15:42

## 2025-01-20 RX ADMIN — GUAIFENESIN 600 MG: 600 TABLET ORAL at 20:43

## 2025-01-20 RX ADMIN — IPRATROPIUM BROMIDE AND ALBUTEROL SULFATE 1 DOSE: 2.5; .5 SOLUTION RESPIRATORY (INHALATION) at 08:00

## 2025-01-20 RX ADMIN — INSULIN LISPRO 16 UNITS: 100 INJECTION, SOLUTION INTRAVENOUS; SUBCUTANEOUS at 20:43

## 2025-01-20 RX ADMIN — INSULIN GLARGINE 30 UNITS: 100 INJECTION, SOLUTION SUBCUTANEOUS at 08:52

## 2025-01-20 RX ADMIN — IPRATROPIUM BROMIDE AND ALBUTEROL SULFATE 1 DOSE: 2.5; .5 SOLUTION RESPIRATORY (INHALATION) at 12:18

## 2025-01-20 RX ADMIN — BUDESONIDE INHALATION 500 MCG: 0.5 SUSPENSION RESPIRATORY (INHALATION) at 20:18

## 2025-01-20 RX ADMIN — INSULIN LISPRO 16 UNITS: 100 INJECTION, SOLUTION INTRAVENOUS; SUBCUTANEOUS at 08:52

## 2025-01-20 RX ADMIN — BUDESONIDE INHALATION 500 MCG: 0.5 SUSPENSION RESPIRATORY (INHALATION) at 08:00

## 2025-01-20 RX ADMIN — IPRATROPIUM BROMIDE AND ALBUTEROL SULFATE 1 DOSE: 2.5; .5 SOLUTION RESPIRATORY (INHALATION) at 16:34

## 2025-01-20 RX ADMIN — IPRATROPIUM BROMIDE AND ALBUTEROL SULFATE 1 DOSE: 2.5; .5 SOLUTION RESPIRATORY (INHALATION) at 04:15

## 2025-01-20 RX ADMIN — IOPAMIDOL 75 ML: 755 INJECTION, SOLUTION INTRAVENOUS at 00:18

## 2025-01-20 RX ADMIN — INSULIN LISPRO 16 UNITS: 100 INJECTION, SOLUTION INTRAVENOUS; SUBCUTANEOUS at 17:57

## 2025-01-20 RX ADMIN — INSULIN GLARGINE 5 UNITS: 100 INJECTION, SOLUTION SUBCUTANEOUS at 20:44

## 2025-01-20 RX ADMIN — GUAIFENESIN 600 MG: 600 TABLET ORAL at 08:51

## 2025-01-20 RX ADMIN — METHYLPREDNISOLONE SODIUM SUCCINATE 60 MG: 125 INJECTION INTRAMUSCULAR; INTRAVENOUS at 08:55

## 2025-01-20 ASSESSMENT — PAIN SCALES - GENERAL: PAINLEVEL_OUTOF10: 0

## 2025-01-20 NOTE — PROGRESS NOTES
[facility]: [place] 5W PROGRESSIVE CARE  DYSPHAGIA BEDSIDE SWALLOW EVALUATION     Patient: Srini Recio   : 1970   MRN: [id number]      Evaluation Date: 2025     Admitting Diagnosis:   Confusion [R41.0]  Elevated troponin [R79.89]  PAT (acute kidney injury) (HCC) [N17.9]  Acute respiratory failure with hypoxia [J96.01]  Sepsis (HCC) [A41.9]  Severe sepsis (HCC) [A41.9, R65.20]  Type 2 diabetes mellitus with hyperglycemia, with long-term current use of insulin (HCC) [E11.65, Z79.4]  Community acquired pneumonia of right lung, unspecified part of lung [J18.9]   has a past medical history of Hypertension and Type 2 diabetes mellitus (HCC).   has a past surgical history that includes hernia repair and Toe amputation (Right, 2024).  Allergies: NKA  Dysphagia History including instrumental assessment: none on record  GI history: colorectal neuroendocrine carcinoma ()  Baseline/Prior Level of Function: Living Status: admitted from home; Baseline diet: regular/thin    Onset Date: 2025        Reason for referral: SLP evaluation orders received due to pt/family reports of trouble swallowing                         CURRENT ENCOUNTER DIAGNOSTICS/COURSE OF ADMISSION     2025 admitted with c/o hyperglycemia and fever. MD ADMISSION H&P HPI: \"54m with history of DM, HTN, presents to the ER with a 2-3 week history of feeling ill. Patient states he developed a cough, then anorexia after shoveling snow over a 3 day period. He felt fatigued, had poor appetite, but denies n/v/d, early satiety, heartburn. He denies increased urinary frequency, urinary hesitancy. He felt cold, but denies fever, chills, diaphoresis. Developed sob, cough, with some difficultly swallowing prompting patient's wife to insist on ER evaluation. Currently patient is seen on nrb, awake, alert, oriented, coherently responsive\"; ED Provider note does refer to an episode of vomiting with occasional nausea and reports for diarrhea x 2

## 2025-01-20 NOTE — ACP (ADVANCE CARE PLANNING)
Advance Care Planning   Healthcare Decision Maker:    Primary Decision Maker: Jane Recio - Spouse - [phone]    Today we documented Decision Maker(s) consistent with Legal Next of Kin hierarchy.     #[id number]  Electronically signed by Haylee Morales RN on 1/20/2025 at 11:53 AM

## 2025-01-20 NOTE — PROGRESS NOTES
PCA notified this RN that pt FSBS is 441. Provider notified. No new orders at this time. Per sliding scale pt is to get 16 units of insulin.

## 2025-01-20 NOTE — CONSULTS
GI Consult Note    Patient: Srini Recio  : 1970  Acct#:      Date:  2025    Subjective:       History of Present Illness  Patient is a 54 y.o. male admitted with Confusion [R41.0]  Elevated troponin [R79.89]  PAT (acute kidney injury) (HCC) [N17.9]  Acute respiratory failure with hypoxia [J96.01]  Sepsis (HCC) [A41.9]  Severe sepsis (HCC) [A41.9, R65.20]  Type 2 diabetes mellitus with hyperglycemia, with long-term current use of insulin (HCC) [E11.65, Z79.4]  Community acquired pneumonia of right lung, unspecified part of lung [J18.9] who is seen in consult for dysphagia.  He has a history of type 2 diabetes, Hypertension, toe amputation, and hernia repair at birth.  He follows with Dr. [facility]ts at [facility].  He had a colonoscopy 2023 that showed an inadequate bowel preparation.  There was a 5 mm rectal polyp and a 15 mm rectal polyp removed with endoscopic mucosal resection.  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  He presented for hyperglycemia on .  Diagnosed with a large right upper lobe pneumonia.  He is being treated with antibiotics.  He has been given IV steroids for his severe pneumonia.  Speech therapy was consulted and there was no obvious oropharyngeal dysphagia.  Modified barium swallow was recommended and further evaluation was deemed necessary.  Regular diet with thin liquids was recommended.  We are consulted for this dysphagia.  CAT scan of the chest showed right upper lobe pneumonia and reactive lymphadenopathy.  He tells me that he has been doing a lot of soft food at home with kale smoothies and other types of food that is soft.  He has had a little bit of heartburn.  When he came into the hospital he felt fatigued and did not feel like he could really swallow much.  However, he has never had a problem with food getting stuck in his esophagus.  He has not had vomiting.  No odynophagia.  He is feeling stronger

## 2025-01-20 NOTE — PLAN OF CARE
Problem: Chronic Conditions and Co-morbidities  Goal: Patient's chronic conditions and co-morbidity symptoms are monitored and maintained or improved  1/20/2025 1024 by Ebony Song RN  Outcome: Progressing  1/19/2025 2145 by Cherie Figueroa RN  Outcome: Progressing     Problem: Discharge Planning  Goal: Discharge to home or other facility with appropriate resources  1/20/2025 1024 by Ebony Song RN  Outcome: Progressing  1/19/2025 2145 by Cherie Figueroa RN  Outcome: Progressing     Problem: Pain  Goal: Verbalizes/displays adequate comfort level or baseline comfort level  1/20/2025 1024 by Ebony Song RN  Outcome: Progressing  1/19/2025 2145 by Cherie Figueroa RN  Outcome: Progressing     Problem: Safety - Adult  Goal: Free from fall injury  1/20/2025 1024 by Ebony Song RN  Outcome: Progressing  1/19/2025 2145 by Cherie Figueroa RN  Outcome: Progressing     Problem: Skin/Tissue Integrity  Goal: Absence of new skin breakdown  Description: 1.  Monitor for areas of redness and/or skin breakdown  2.  Assess vascular access sites hourly  3.  Every 4-6 hours minimum:  Change oxygen saturation probe site  4.  Every 4-6 hours:  If on nasal continuous positive airway pressure, respiratory therapy assess nares and determine need for appliance change or resting period.  1/20/2025 1024 by Ebony Song RN  Outcome: Progressing  1/19/2025 2145 by Cherie Figueroa RN  Outcome: Progressing

## 2025-01-20 NOTE — PROGRESS NOTES
V2.0    [facility] [place] Progress Note      Name:  Srini Recio /Age/Sex: 1970  (54 y.o. male)   MRN & CSN:  [id number] & [id number] Encounter Date/Time: 2025 10:49 AM EST   Location:  [id number]-[id number]/5121-01 PCP: No primary care provider on file.     Attending:Jordy Heath MD       Hospital Day: 3    Assessment and Recommendations   Srini Recio is a 54 y.o. male with pmh of hypertension, type 2 diabetes who presents with Sepsis (HCC)    Patient was examined this morning.  Nursing staff are at the bedside given overnight events and updates.  Patient is currently on 11 L high flow via nasal cannula  He attributes that his respiratory symptoms are improving from presentation  Patient was started on steroids yesterday which increased his glucose and his white blood cell count  Adjusted insulin from 20 units to 30 units this morning, sliding scale to high  Will monitor glucose for further adjustment if needed    Patient also attributes to having swallowing difficulties.  This has been on and off for quite some time.  Will consult GI for their recommendations    Will continue breathing treatments    Cultures growing Legionella pneumophila on pneumonia panel  Will switch patient to Levaquin 750 mg IV daily and will discontinue azithromycin and Rocephin     Labs this morning sodium 136, potassium 4.3, creatinine 1.2, glucose 380 will receive 30 units of Lantus plus sliding scale, white blood cell count 18.7 increased from yesterday 12.5, patient is on IV steroids          Plan:   Sepsis  Patient met sepsis criteria on presentation due to most likely right upper lobe pneumonia  IV fluid with normal saline 30 mg/kg  Lactic acid is within normal limits this morning.    2.  Pneumonia  Chest congestion, productive cough x 2-3 weeks  Chest x-ray shows right upper lobe pneumonia  Azithromycin 500 mg IV daily  Rocephin 1 g IV daily  DuoNeb breathing treatments  Budesonide breathing treatments  Incentive spirometer    3.

## 2025-01-20 NOTE — CARE COORDINATION
Case Management Assessment  Initial Evaluation    Date/Time of Evaluation: 1/20/2025 11:57 AM  Assessment Completed by: Haylee Morales RN    If patient is discharged prior to next notation, then this note serves as note for discharge by case management.    Patient Name: Srini Recio                   YOB: 1970  Diagnosis: Confusion [R41.0]  Elevated troponin [R79.89]  PAT (acute kidney injury) (HCC) [N17.9]  Acute respiratory failure with hypoxia [J96.01]  Sepsis (HCC) [A41.9]  Severe sepsis (HCC) [A41.9, R65.20]  Type 2 diabetes mellitus with hyperglycemia, with long-term current use of insulin (HCC) [E11.65, Z79.4]  Community acquired pneumonia of right lung, unspecified part of lung [J18.9]                   Date / Time: 1/18/2025  8:46 PM    Patient Admission Status: Inpatient   Readmission Risk (Low < 19, Mod (19-27), High > 27): Readmission Risk Score: 14.7    Current PCP: No primary care provider on file.  PCP verified by CM? No (pt stating he has a PCP, but didn't remeber his name)    Chart Reviewed: Yes      History Provided by: Patient  Patient Orientation: Alert and Oriented    Patient Cognition: Alert    Hospitalization in the last 30 days (Readmission):  No    If yes, Readmission Assessment in CM Navigator will be completed.    Advance Directives:      Code Status: Full Code   Patient's Primary Decision Maker is: Legal Next of Kin    Primary Decision Maker: Jane Recio - Spouse - [phone]    Discharge Planning:    Patient lives with: Spouse/Significant Other Type of Home: House  Primary Care Giver: Self  Patient Support Systems include: Spouse/Significant Other, Family Members   Current Financial resources: Other (Comment) (self-pay)  Current community resources: None  Current services prior to admission: Durable Medical Equipment            Current DME: [employer] (has but doesn't use)            Type of Home Care services:  None    ADLS  Prior functional level: Independent

## 2025-01-21 LAB
ALBUMIN SERPL-MCNC: 2.8 G/DL (ref 3.4–5)
ALBUMIN/GLOB SERPL: 0.7 {RATIO} (ref 1.1–2.2)
ALP SERPL-CCNC: 104 U/L (ref 40–129)
ALT SERPL-CCNC: 43 U/L (ref 10–40)
ANION GAP SERPL CALCULATED.3IONS-SCNC: 10 MMOL/L (ref 3–16)
AST SERPL-CCNC: 47 U/L (ref 15–37)
BACTERIA SPEC RESP CULT: NORMAL
BILIRUB SERPL-MCNC: 0.3 MG/DL (ref 0–1)
BUN SERPL-MCNC: 23 MG/DL (ref 7–20)
CALCIUM SERPL-MCNC: 8.1 MG/DL (ref 8.3–10.6)
CHLORIDE SERPL-SCNC: 101 MMOL/L (ref 99–110)
CO2 SERPL-SCNC: 26 MMOL/L (ref 21–32)
CREAT SERPL-MCNC: 1.1 MG/DL (ref 0.9–1.3)
DEPRECATED RDW RBC AUTO: 15.5 % (ref 12.4–15.4)
GFR SERPLBLD CREATININE-BSD FMLA CKD-EPI: 79 ML/MIN/{1.73_M2}
GLUCOSE BLD-MCNC: 238 MG/DL (ref 70–99)
GLUCOSE BLD-MCNC: 285 MG/DL (ref 70–99)
GLUCOSE BLD-MCNC: 292 MG/DL (ref 70–99)
GLUCOSE BLD-MCNC: 302 MG/DL (ref 70–99)
GLUCOSE SERPL-MCNC: 268 MG/DL (ref 70–99)
GRAM STN SPEC: NORMAL
HCT VFR BLD AUTO: 32.2 % (ref 40.5–52.5)
HGB BLD-MCNC: 10.3 G/DL (ref 13.5–17.5)
IRON SERPL-MCNC: 36 UG/DL (ref 59–158)
MCH RBC QN AUTO: 21.8 PG (ref 26–34)
MCHC RBC AUTO-ENTMCNC: 32 G/DL (ref 31–36)
MCV RBC AUTO: 68.2 FL (ref 80–100)
PATH INTERP BLD-IMP: NO
PERFORMED ON: ABNORMAL
PLATELET # BLD AUTO: 320 K/UL (ref 135–450)
PLATELET BLD QL SMEAR: ADEQUATE
PMV BLD AUTO: 9.9 FL (ref 5–10.5)
POTASSIUM SERPL-SCNC: 3.7 MMOL/L (ref 3.5–5.1)
PROCALCITONIN SERPL IA-MCNC: 4.03 NG/ML (ref 0–0.15)
PROT SERPL-MCNC: 6.7 G/DL (ref 6.4–8.2)
RBC # BLD AUTO: 4.73 M/UL (ref 4.2–5.9)
SLIDE REVIEW: ABNORMAL
SODIUM SERPL-SCNC: 137 MMOL/L (ref 136–145)
WBC # BLD AUTO: 17 K/UL (ref 4–11)

## 2025-01-21 PROCEDURE — 6370000000 HC RX 637 (ALT 250 FOR IP): Performed by: FAMILY MEDICINE

## 2025-01-21 PROCEDURE — 2060000000 HC ICU INTERMEDIATE R&B

## 2025-01-21 PROCEDURE — 6360000002 HC RX W HCPCS: Performed by: FAMILY MEDICINE

## 2025-01-21 PROCEDURE — 94761 N-INVAS EAR/PLS OXIMETRY MLT: CPT

## 2025-01-21 PROCEDURE — 36415 COLL VENOUS BLD VENIPUNCTURE: CPT

## 2025-01-21 PROCEDURE — 84145 PROCALCITONIN (PCT): CPT

## 2025-01-21 PROCEDURE — 99233 SBSQ HOSP IP/OBS HIGH 50: CPT | Performed by: INTERNAL MEDICINE

## 2025-01-21 PROCEDURE — 6370000000 HC RX 637 (ALT 250 FOR IP): Performed by: INTERNAL MEDICINE

## 2025-01-21 PROCEDURE — 2500000003 HC RX 250 WO HCPCS

## 2025-01-21 PROCEDURE — 92526 ORAL FUNCTION THERAPY: CPT

## 2025-01-21 PROCEDURE — 94640 AIRWAY INHALATION TREATMENT: CPT

## 2025-01-21 PROCEDURE — 2700000000 HC OXYGEN THERAPY PER DAY

## 2025-01-21 PROCEDURE — 80053 COMPREHEN METABOLIC PANEL: CPT

## 2025-01-21 PROCEDURE — 6360000002 HC RX W HCPCS: Performed by: INTERNAL MEDICINE

## 2025-01-21 PROCEDURE — 85027 COMPLETE CBC AUTOMATED: CPT

## 2025-01-21 PROCEDURE — 83540 ASSAY OF IRON: CPT

## 2025-01-21 PROCEDURE — 2500000003 HC RX 250 WO HCPCS: Performed by: HOSPITALIST

## 2025-01-21 RX ORDER — WATER 10 ML/10ML
INJECTION INTRAMUSCULAR; INTRAVENOUS; SUBCUTANEOUS
Status: COMPLETED
Start: 2025-01-21 | End: 2025-01-21

## 2025-01-21 RX ORDER — INSULIN GLARGINE 100 [IU]/ML
35 INJECTION, SOLUTION SUBCUTANEOUS DAILY
Status: DISCONTINUED | OUTPATIENT
Start: 2025-01-22 | End: 2025-01-23 | Stop reason: HOSPADM

## 2025-01-21 RX ORDER — INSULIN LISPRO 100 [IU]/ML
9 INJECTION, SOLUTION INTRAVENOUS; SUBCUTANEOUS
Status: DISCONTINUED | OUTPATIENT
Start: 2025-01-21 | End: 2025-01-23 | Stop reason: HOSPADM

## 2025-01-21 RX ORDER — INSULIN LISPRO 100 [IU]/ML
4 INJECTION, SOLUTION INTRAVENOUS; SUBCUTANEOUS
Status: DISCONTINUED | OUTPATIENT
Start: 2025-01-21 | End: 2025-01-21

## 2025-01-21 RX ORDER — INSULIN GLARGINE 100 [IU]/ML
5 INJECTION, SOLUTION SUBCUTANEOUS ONCE
Status: COMPLETED | OUTPATIENT
Start: 2025-01-21 | End: 2025-01-21

## 2025-01-21 RX ORDER — PREDNISONE 20 MG/1
20 TABLET ORAL DAILY
Status: DISCONTINUED | OUTPATIENT
Start: 2025-01-22 | End: 2025-01-23 | Stop reason: HOSPADM

## 2025-01-21 RX ADMIN — GUAIFENESIN 600 MG: 600 TABLET ORAL at 20:23

## 2025-01-21 RX ADMIN — INSULIN LISPRO 8 UNITS: 100 INJECTION, SOLUTION INTRAVENOUS; SUBCUTANEOUS at 16:09

## 2025-01-21 RX ADMIN — ENOXAPARIN SODIUM 40 MG: 100 INJECTION SUBCUTANEOUS at 10:39

## 2025-01-21 RX ADMIN — GUAIFENESIN 600 MG: 600 TABLET ORAL at 12:05

## 2025-01-21 RX ADMIN — WATER 10 ML: 1 INJECTION INTRAMUSCULAR; INTRAVENOUS; SUBCUTANEOUS at 10:57

## 2025-01-21 RX ADMIN — INSULIN LISPRO 12 UNITS: 100 INJECTION, SOLUTION INTRAVENOUS; SUBCUTANEOUS at 20:23

## 2025-01-21 RX ADMIN — INSULIN GLARGINE 30 UNITS: 100 INJECTION, SOLUTION SUBCUTANEOUS at 10:39

## 2025-01-21 RX ADMIN — IPRATROPIUM BROMIDE AND ALBUTEROL SULFATE 1 DOSE: 2.5; .5 SOLUTION RESPIRATORY (INHALATION) at 15:56

## 2025-01-21 RX ADMIN — IPRATROPIUM BROMIDE AND ALBUTEROL SULFATE 1 DOSE: 2.5; .5 SOLUTION RESPIRATORY (INHALATION) at 07:51

## 2025-01-21 RX ADMIN — SODIUM CHLORIDE, PRESERVATIVE FREE 10 ML: 5 INJECTION INTRAVENOUS at 10:44

## 2025-01-21 RX ADMIN — LEVOFLOXACIN 500 MG: 5 INJECTION, SOLUTION INTRAVENOUS at 16:01

## 2025-01-21 RX ADMIN — IPRATROPIUM BROMIDE AND ALBUTEROL SULFATE 1 DOSE: 2.5; .5 SOLUTION RESPIRATORY (INHALATION) at 11:51

## 2025-01-21 RX ADMIN — INSULIN GLARGINE 5 UNITS: 100 INJECTION, SOLUTION SUBCUTANEOUS at 16:10

## 2025-01-21 RX ADMIN — INSULIN LISPRO 9 UNITS: 100 INJECTION, SOLUTION INTRAVENOUS; SUBCUTANEOUS at 16:09

## 2025-01-21 RX ADMIN — METHYLPREDNISOLONE SODIUM SUCCINATE 60 MG: 125 INJECTION INTRAMUSCULAR; INTRAVENOUS at 10:56

## 2025-01-21 RX ADMIN — INSULIN LISPRO 4 UNITS: 100 INJECTION, SOLUTION INTRAVENOUS; SUBCUTANEOUS at 10:43

## 2025-01-21 RX ADMIN — BUDESONIDE INHALATION 500 MCG: 0.5 SUSPENSION RESPIRATORY (INHALATION) at 20:29

## 2025-01-21 RX ADMIN — IPRATROPIUM BROMIDE AND ALBUTEROL SULFATE 1 DOSE: 2.5; .5 SOLUTION RESPIRATORY (INHALATION) at 20:29

## 2025-01-21 RX ADMIN — PANTOPRAZOLE SODIUM 40 MG: 40 TABLET, DELAYED RELEASE ORAL at 06:23

## 2025-01-21 RX ADMIN — BUDESONIDE INHALATION 500 MCG: 0.5 SUSPENSION RESPIRATORY (INHALATION) at 08:01

## 2025-01-21 ASSESSMENT — PAIN SCALES - GENERAL
PAINLEVEL_OUTOF10: 0
PAINLEVEL_OUTOF10: 0

## 2025-01-21 NOTE — PROGRESS NOTES
V2.0    [facility] [place] Progress Note      Name:  Srini Recio /Age/Sex: 1970  (54 y.o. male)   MRN & CSN:  [id number] & [id number] Encounter Date/Time: 2025 10:49 AM EST   Location:  [id number]-3992/5121-01 PCP: No primary care provider on file.     Attending:Jordy Heath MD       Hospital Day: 4    Assessment and Recommendations   Srini Recio is a 54 y.o. male with pmh of hypertension, type 2 diabetes who presents with Sepsis (HCC)    Patient was examined this morning.    Patient was able to be weaned from 11 L nasal cannula high flow to 5 L nasal cannula this morning  He continues to show improvement with the current regimen of Levaquin 750 mg, breathing treatment, steroids    GI was consulted regarding patient's dysphagia, their note is reviewed, recommendation for starting off of PPI for 4 to 6 weeks.  Patient sitting in his bed having breakfast this morning with no swallowing issues.  Will consult diabetic educator for further management of his uncontrolled diabetes, hemoglobin A1c of 12.3, and being on steroid        Cultures growing Legionella pneumophila on pneumonia panel  Will switch patient to Levaquin 750 mg IV daily and will discontinue azithromycin and Rocephin       Labs this morning sodium 137, potassium 3.7, creatinine 1.1, glucose 268, white blood cell count 17.0 improved from 18.7, patient is currently on IV steroid  H&H are at 10.3, 32.2, MCV 68.2 will obtain iron studies, will transfuse iron if iron deficient      Plan:   Sepsis  Patient met sepsis criteria on presentation due to most likely right upper lobe pneumonia  IV fluid with normal saline 30 mg/kg  Lactic acid is within normal limits this morning.      2.  Pneumonia  Chest congestion, productive cough x 2-3 weeks  Chest x-ray shows right upper lobe pneumonia  Cultures growing Legionella pneumophila on pneumonia panel  Azithromycin, Rocephin stopped Levaquin started  DuoNeb breathing treatments  Budesonide breathing

## 2025-01-21 NOTE — PROGRESS NOTES
Provider notified about FSBS 341. No new orders. 16 units of Humalog given along with 5 units of lantus per orders. See mar. Care on going

## 2025-01-21 NOTE — RT PROTOCOL NOTE
RT Inhaler-Nebulizer Bronchodilator Protocol Note    There is a bronchodilator order in the chart from a provider indicating to follow the RT Bronchodilator Protocol and there is an “Initiate RT Inhaler-Nebulizer Bronchodilator Protocol” order as well (see protocol at bottom of note).    CXR Findings:  XR CHEST PORTABLE    Result Date: 1/18/2025  Right-sided pneumonia predominately involving the right upper lobe. Recommend radiographic follow-up to ensure complete resolution.       The findings from the last RT Protocol Assessment were as follows:   History Pulmonary Disease: Chronic pulmonary disease  Respiratory Pattern: Mild dyspnea at rest, irregular pattern, or RR 21-25 bpm  Breath Sounds: Slightly diminished and/or crackles  Cough: Strong, productive  Indication for Bronchodilator Therapy: Wheezing associated with pulm disorder, Decreased or absent breath sounds  Bronchodilator Assessment Score: 9    Aerosolized bronchodilator medication orders have been revised according to the RT Inhaler-Nebulizer Bronchodilator Protocol below.    Respiratory Therapist to perform RT Therapy Protocol Assessment initially then follow the protocol.  Repeat RT Therapy Protocol Assessment PRN for score 0-3 or on second treatment, BID, and PRN for scores above 3.    No Indications - adjust the frequency to every 6 hours PRN wheezing or bronchospasm, if no treatments needed after 48 hours then discontinue using Per Protocol order mode.     If indication present, adjust the RT bronchodilator orders based on the Bronchodilator Assessment Score as indicated below.  Use Inhaler orders unless patient has one or more of the following: on home nebulizer, not able to hold breath for 10 seconds, is not alert and oriented, cannot activate and use MDI correctly, or respiratory rate 25 breaths per minute or more, then use the equivalent nebulizer order(s) with same Frequency and PRN reasons based on the score.  If a patient is on this

## 2025-01-21 NOTE — PROGRESS NOTES
Pulmonary Progress Note    Date of Admission: 1/18/2025   LOS: 3 days       CC:  Chief Complaint   Patient presents with    Blood Sugar Problem     Hyperglycemia with confusion, tachycardia, tachypnea.  Had been non-compliant with medications until 1 week ago.    Fever     Fever of unspecified cause, family reports no known sick contacts.        Subjective:  Feeling better    ROS:   No nausea  No Vomiting  No chest pain       Assessment:          Plan:     This note may have been transcribed using Dragon Dictation software. Please disregard any translational errors.       Hospital Day: 3     Multifocal pneumonia, Legionella  Levaquin  Steroids for severe pneumonia  Changed to prednisone 20 mg x 3 days  Follow procal.   Hypoxemia quickly improving.    Weaned to 90% saturation          Data:        PHYSICAL EXAM:   Blood pressure 130/80, pulse 83, temperature 98.1 °F (36.7 °C), temperature source Oral, resp. rate 16, height 1.854 m (6' 1\"), weight 104.8 kg (231 lb 0.7 oz), SpO2 95%.'  Body mass index is 30.48 kg/m².   Gen: No distress.    ENT:   Resp: No accessory muscle use. No crackles. No wheezes. No rhonchi.    CV: Regular rate. Regular rhythm. No murmur or rub. No edema.   Skin: Warm, dry, normal texture and turgor. No nodule on exposed extremities.   M/S: No cyanosis. No clubbing. No joint deformity.  Psych: Oriented x 3. No anxiety.  Awake. Alert. Intact judgement and insight. Good Mood / Affect.  Memory appears in tact       Medications:    Scheduled Meds:   [START ON 1/22/2025] insulin glargine  35 Units SubCUTAneous Daily    insulin glargine  5 Units SubCUTAneous Once    insulin lispro  9 Units SubCUTAneous TID WC    levofloxacin  250 mg IntraVENous Q24H    And    levofloxacin  500 mg IntraVENous Q24H    pantoprazole  40 mg Oral QAM AC    ipratropium 0.5 mg-albuterol 2.5 mg  1 Dose Inhalation Q4H WA RT    sodium chloride flush  5-40 mL IntraVENous 2 times per day    budesonide  0.5 mg Nebulization BID RT

## 2025-01-21 NOTE — CONSULTS
Nutrition Education    Educated on Carb counting, discussed patient's diet at home, ways to add healthy proteins, diabetic cookbook  Learners: Patient  Readiness: Eager  Method: Explanation and Handout  Response: Verbalizes Understanding  Contact name and number provided.    LANRE ISIDRO RD, LD  Contact Number: Office: [phone]; [place]: [phone]

## 2025-01-21 NOTE — PLAN OF CARE
Problem: Chronic Conditions and Co-morbidities  Goal: Patient's chronic conditions and co-morbidity symptoms are monitored and maintained or improved  1/20/2025 2340 by Cherie Figueroa RN  Outcome: Progressing  1/20/2025 1024 by Ebony Song RN  Outcome: Progressing     Problem: Pain  Goal: Verbalizes/displays adequate comfort level or baseline comfort level  1/20/2025 2340 by Cherie Figueroa RN  Outcome: Progressing  1/20/2025 1024 by Ebony Song RN  Outcome: Progressing     Problem: Safety - Adult  Goal: Free from fall injury  1/20/2025 2340 by Cherie Figueroa RN  Outcome: Progressing  1/20/2025 1024 by Ebony Song RN  Outcome: Progressing

## 2025-01-21 NOTE — PROGRESS NOTES
[facility]  Diabetes Education   Progress Note       NAME:  Srini Recio  MEDICAL RECORD NUMBER:  [id number]  AGE: 54 y.o.   GENDER: male  : 1970  TODAY'S DATE:  2025    Subjective   Reason for Diabetic Education Evaluation and Assessment: hyperglycemia    Srini expressed concern that his blood sugars are elevated.  Reports blood sugars in better trends than a1c reflects.          Visit Type: evaluation      Srini Recio is a 54 y.o. male referred by:     [x] Physician  [] Nursing  [] Chart Review   [] Other:     PAST MEDICAL HISTORY        Diagnosis Date    Hypertension     Type 2 diabetes mellitus (HCC)        PAST SURGICAL HISTORY    Past Surgical History:   Procedure Laterality Date    HERNIA REPAIR      at birth    TOE AMPUTATION Right 2024    AMPUTATION DISTAL RIGHT SECOND TOE performed by Beto Hassan DPM at [facility] OR       FAMILY HISTORY    Family History   Problem Relation Age of Onset    Alcohol Abuse Father        SOCIAL HISTORY    Social History     Tobacco Use    Smoking status: Never    Smokeless tobacco: Never   Substance Use Topics    Alcohol use: Never    Drug use: Never       ALLERGIES    No Known Allergies    MEDICATIONS     [START ON 2025] insulin glargine  35 Units SubCUTAneous Daily    insulin glargine  5 Units SubCUTAneous Once    insulin lispro  4 Units SubCUTAneous TID WC    levofloxacin  250 mg IntraVENous Q24H    And    levofloxacin  500 mg IntraVENous Q24H    pantoprazole  40 mg Oral QAM AC    ipratropium 0.5 mg-albuterol 2.5 mg  1 Dose Inhalation Q4H WA RT    sodium chloride flush  5-40 mL IntraVENous 2 times per day    budesonide  0.5 mg Nebulization BID RT    guaiFENesin  600 mg Oral BID    enoxaparin  40 mg SubCUTAneous Daily    methylPREDNISolone  60 mg IntraVENous Daily    insulin lispro  0-16 Units SubCUTAneous 4x Daily AC & HS       Objective        Patient Active Problem List   Diagnosis Code    Osteomyelitis M86.9    Type 2 diabetes

## 2025-01-21 NOTE — PROGRESS NOTES
[facility]  SLP DYSPHAGIA THERAPY  DISCHARGE SUMMARY    Patient: Srini Recio   : 1970   MRN: [id number]    Treatment Date: 2025   Admitting Diagnosis:   Confusion [R41.0]  Elevated troponin [R79.89]  PAT (acute kidney injury) (HCC) [N17.9]  Acute respiratory failure with hypoxia [J96.01]  Sepsis (HCC) [A41.9]  Severe sepsis (HCC) [A41.9, R65.20]  Type 2 diabetes mellitus with hyperglycemia, with long-term current use of insulin (HCC) [E11.65, Z79.4]  Community acquired pneumonia of right lung, unspecified part of lung [J18.9]   has a past medical history of Hypertension and Type 2 diabetes mellitus (HCC).   has a past surgical history that includes hernia repair and Toe amputation (Right, 2024).  Allergies: NKA  Dysphagia History including instrumental assessment: none on record  GI history: colorectal neuroendocrine carcinoma ()  Baseline/Prior Level of Function: Living Status: admitted from home; Baseline diet: regular/thin    Onset: 2025     Treatment Diagnosis: Oropharyngeal dysphagia    CURRENT ENCOUNTER DIAGNOSTICS/COURSE OF ADMISSION     2025 admitted with c/o hyperglycemia and fever. MD ADMISSION H&P HPI: \"54m with history of DM, HTN, presents to the ER with a 2-3 week history of feeling ill. Patient states he developed a cough, then anorexia after shoveling snow over a 3 day period. He felt fatigued, had poor appetite, but denies n/v/d, early satiety, heartburn. He denies increased urinary frequency, urinary hesitancy. He felt cold, but denies fever, chills, diaphoresis. Developed sob, cough, with some difficultly swallowing prompting patient's wife to insist on ER evaluation. Currently patient is seen on nrb, awake, alert, oriented, coherently responsive\"; ED Provider note does refer to an episode of vomiting with occasional nausea and reports for diarrhea x 2 days prior to presentation  Consults noted: Pulmonology     Most Recent Imagin2025 CXR: IMPRESSION:

## 2025-01-22 LAB
ALBUMIN SERPL-MCNC: 2.8 G/DL (ref 3.4–5)
ALBUMIN/GLOB SERPL: 0.8 {RATIO} (ref 1.1–2.2)
ALP SERPL-CCNC: 78 U/L (ref 40–129)
ALT SERPL-CCNC: 43 U/L (ref 10–40)
ANION GAP SERPL CALCULATED.3IONS-SCNC: 11 MMOL/L (ref 3–16)
AST SERPL-CCNC: 46 U/L (ref 15–37)
BILIRUB SERPL-MCNC: 0.3 MG/DL (ref 0–1)
BUN SERPL-MCNC: 26 MG/DL (ref 7–20)
CALCIUM SERPL-MCNC: 8.2 MG/DL (ref 8.3–10.6)
CHLORIDE SERPL-SCNC: 104 MMOL/L (ref 99–110)
CO2 SERPL-SCNC: 26 MMOL/L (ref 21–32)
CREAT SERPL-MCNC: 1.3 MG/DL (ref 0.9–1.3)
DEPRECATED RDW RBC AUTO: 15.2 % (ref 12.4–15.4)
GFR SERPLBLD CREATININE-BSD FMLA CKD-EPI: 64 ML/MIN/{1.73_M2}
GLUCOSE BLD-MCNC: 128 MG/DL (ref 70–99)
GLUCOSE BLD-MCNC: 149 MG/DL (ref 70–99)
GLUCOSE BLD-MCNC: 176 MG/DL (ref 70–99)
GLUCOSE BLD-MCNC: 187 MG/DL (ref 70–99)
GLUCOSE SERPL-MCNC: 158 MG/DL (ref 70–99)
HCT VFR BLD AUTO: 31.6 % (ref 40.5–52.5)
HGB BLD-MCNC: 10 G/DL (ref 13.5–17.5)
MCH RBC QN AUTO: 21.5 PG (ref 26–34)
MCHC RBC AUTO-ENTMCNC: 31.8 G/DL (ref 31–36)
MCV RBC AUTO: 67.7 FL (ref 80–100)
PATH INTERP BLD-IMP: NO
PERFORMED ON: ABNORMAL
PLATELET # BLD AUTO: 489 K/UL (ref 135–450)
PMV BLD AUTO: 9.4 FL (ref 5–10.5)
POTASSIUM SERPL-SCNC: 3.6 MMOL/L (ref 3.5–5.1)
PROCALCITONIN SERPL IA-MCNC: 2.33 NG/ML (ref 0–0.15)
PROT SERPL-MCNC: 6.5 G/DL (ref 6.4–8.2)
RBC # BLD AUTO: 4.67 M/UL (ref 4.2–5.9)
SODIUM SERPL-SCNC: 141 MMOL/L (ref 136–145)
WBC # BLD AUTO: 16.9 K/UL (ref 4–11)

## 2025-01-22 PROCEDURE — 86341 ISLET CELL ANTIBODY: CPT

## 2025-01-22 PROCEDURE — 6370000000 HC RX 637 (ALT 250 FOR IP): Performed by: INTERNAL MEDICINE

## 2025-01-22 PROCEDURE — 6370000000 HC RX 637 (ALT 250 FOR IP): Performed by: FAMILY MEDICINE

## 2025-01-22 PROCEDURE — 6360000002 HC RX W HCPCS: Performed by: FAMILY MEDICINE

## 2025-01-22 PROCEDURE — 94680 O2 UPTK RST&XERS DIR SIMPLE: CPT

## 2025-01-22 PROCEDURE — 2060000000 HC ICU INTERMEDIATE R&B

## 2025-01-22 PROCEDURE — 2700000000 HC OXYGEN THERAPY PER DAY

## 2025-01-22 PROCEDURE — 94761 N-INVAS EAR/PLS OXIMETRY MLT: CPT

## 2025-01-22 PROCEDURE — 85027 COMPLETE CBC AUTOMATED: CPT

## 2025-01-22 PROCEDURE — 84145 PROCALCITONIN (PCT): CPT

## 2025-01-22 PROCEDURE — 99232 SBSQ HOSP IP/OBS MODERATE 35: CPT | Performed by: INTERNAL MEDICINE

## 2025-01-22 PROCEDURE — 2500000003 HC RX 250 WO HCPCS: Performed by: HOSPITALIST

## 2025-01-22 PROCEDURE — 84681 ASSAY OF C-PEPTIDE: CPT

## 2025-01-22 PROCEDURE — 80053 COMPREHEN METABOLIC PANEL: CPT

## 2025-01-22 PROCEDURE — 36415 COLL VENOUS BLD VENIPUNCTURE: CPT

## 2025-01-22 PROCEDURE — 86337 INSULIN ANTIBODIES: CPT

## 2025-01-22 PROCEDURE — 94640 AIRWAY INHALATION TREATMENT: CPT

## 2025-01-22 RX ADMIN — IPRATROPIUM BROMIDE AND ALBUTEROL SULFATE 1 DOSE: 2.5; .5 SOLUTION RESPIRATORY (INHALATION) at 16:02

## 2025-01-22 RX ADMIN — INSULIN LISPRO 9 UNITS: 100 INJECTION, SOLUTION INTRAVENOUS; SUBCUTANEOUS at 08:31

## 2025-01-22 RX ADMIN — BUDESONIDE INHALATION 500 MCG: 0.5 SUSPENSION RESPIRATORY (INHALATION) at 19:35

## 2025-01-22 RX ADMIN — GUAIFENESIN 600 MG: 600 TABLET ORAL at 08:31

## 2025-01-22 RX ADMIN — GUAIFENESIN 600 MG: 600 TABLET ORAL at 20:46

## 2025-01-22 RX ADMIN — BUDESONIDE INHALATION 500 MCG: 0.5 SUSPENSION RESPIRATORY (INHALATION) at 08:10

## 2025-01-22 RX ADMIN — IPRATROPIUM BROMIDE AND ALBUTEROL SULFATE 1 DOSE: 2.5; .5 SOLUTION RESPIRATORY (INHALATION) at 08:09

## 2025-01-22 RX ADMIN — SODIUM CHLORIDE, PRESERVATIVE FREE 10 ML: 5 INJECTION INTRAVENOUS at 20:46

## 2025-01-22 RX ADMIN — INSULIN LISPRO 4 UNITS: 100 INJECTION, SOLUTION INTRAVENOUS; SUBCUTANEOUS at 20:46

## 2025-01-22 RX ADMIN — INSULIN LISPRO 9 UNITS: 100 INJECTION, SOLUTION INTRAVENOUS; SUBCUTANEOUS at 13:13

## 2025-01-22 RX ADMIN — PREDNISONE 20 MG: 20 TABLET ORAL at 08:31

## 2025-01-22 RX ADMIN — INSULIN LISPRO 9 UNITS: 100 INJECTION, SOLUTION INTRAVENOUS; SUBCUTANEOUS at 18:29

## 2025-01-22 RX ADMIN — IPRATROPIUM BROMIDE AND ALBUTEROL SULFATE 1 DOSE: 2.5; .5 SOLUTION RESPIRATORY (INHALATION) at 11:50

## 2025-01-22 RX ADMIN — LEVOFLOXACIN 250 MG: 5 INJECTION, SOLUTION INTRAVENOUS at 15:35

## 2025-01-22 RX ADMIN — INSULIN GLARGINE 35 UNITS: 100 INJECTION, SOLUTION SUBCUTANEOUS at 08:31

## 2025-01-22 RX ADMIN — ENOXAPARIN SODIUM 40 MG: 100 INJECTION SUBCUTANEOUS at 08:31

## 2025-01-22 RX ADMIN — IPRATROPIUM BROMIDE AND ALBUTEROL SULFATE 1 DOSE: 2.5; .5 SOLUTION RESPIRATORY (INHALATION) at 19:35

## 2025-01-22 RX ADMIN — LEVOFLOXACIN 500 MG: 5 INJECTION, SOLUTION INTRAVENOUS at 14:14

## 2025-01-22 RX ADMIN — SODIUM CHLORIDE, PRESERVATIVE FREE 10 ML: 5 INJECTION INTRAVENOUS at 08:30

## 2025-01-22 ASSESSMENT — PAIN SCALES - GENERAL: PAINLEVEL_OUTOF10: 0

## 2025-01-22 NOTE — PLAN OF CARE
Problem: Chronic Conditions and Co-morbidities  Goal: Patient's chronic conditions and co-morbidity symptoms are monitored and maintained or improved  1/21/2025 2147 by Cherie Figueroa RN  Outcome: Progressing  1/21/2025 1619 by Lashell Stark RN  Outcome: Progressing     Problem: Discharge Planning  Goal: Discharge to home or other facility with appropriate resources  1/21/2025 2147 by Cherie Figueroa RN  Outcome: Progressing  1/21/2025 1619 by Lashell Stark RN  Outcome: Progressing     Problem: Pain  Goal: Verbalizes/displays adequate comfort level or baseline comfort level  1/21/2025 2147 by Cherie Figueroa RN  Outcome: Progressing  1/21/2025 1619 by Lashell Stark RN  Outcome: Progressing     Problem: Safety - Adult  Goal: Free from fall injury  1/21/2025 2147 by Cherie Figueroa RN  Outcome: Progressing  1/21/2025 1619 by Lashell Stark RN  Outcome: Progressing     Problem: Skin/Tissue Integrity  Goal: Absence of new skin breakdown  Description: 1.  Monitor for areas of redness and/or skin breakdown  2.  Assess vascular access sites hourly  3.  Every 4-6 hours minimum:  Change oxygen saturation probe site  4.  Every 4-6 hours:  If on nasal continuous positive airway pressure, respiratory therapy assess nares and determine need for appliance change or resting period.  1/21/2025 2147 by Cherie Figueroa RN  Outcome: Progressing  1/21/2025 1619 by Lashell Stark RN  Outcome: Progressing

## 2025-01-22 NOTE — PLAN OF CARE
Problem: Chronic Conditions and Co-morbidities  Goal: Patient's chronic conditions and co-morbidity symptoms are monitored and maintained or improved  1/22/2025 1036 by Mary Ellen Rodriguez RN  Outcome: Progressing  Flowsheets (Taken 1/22/2025 0900)  Care Plan - Patient's Chronic Conditions and Co-Morbidity Symptoms are Monitored and Maintained or Improved: Monitor and assess patient's chronic conditions and comorbid symptoms for stability, deterioration, or improvement  1/21/2025 2147 by Cherie Figueroa RN  Outcome: Progressing     Problem: Discharge Planning  Goal: Discharge to home or other facility with appropriate resources  1/22/2025 1036 by Mary Ellen Rodriguez RN  Outcome: Progressing  Flowsheets (Taken 1/22/2025 0900)  Discharge to home or other facility with appropriate resources: Identify barriers to discharge with patient and caregiver  1/21/2025 2147 by Cherie Figueroa RN  Outcome: Progressing     Problem: Pain  Goal: Verbalizes/displays adequate comfort level or baseline comfort level  1/22/2025 1036 by Mary Ellen Rodriguez RN  Outcome: Progressing  1/21/2025 2147 by Cherie Figueroa RN  Outcome: Progressing     Problem: Safety - Adult  Goal: Free from fall injury  1/22/2025 1036 by Mary Ellen Rodriguez RN  Outcome: Progressing  1/21/2025 2147 by Cherie Figueroa RN  Outcome: Progressing     Problem: Skin/Tissue Integrity  Goal: Absence of new skin breakdown  Description: 1.  Monitor for areas of redness and/or skin breakdown  2.  Assess vascular access sites hourly  3.  Every 4-6 hours minimum:  Change oxygen saturation probe site  4.  Every 4-6 hours:  If on nasal continuous positive airway pressure, respiratory therapy assess nares and determine need for appliance change or resting period.  1/22/2025 1036 by Mary Ellen Rodriguez RN  Outcome: Progressing  1/21/2025 2147 by Cherie Figueroa RN  Outcome: Progressing

## 2025-01-22 NOTE — PROGRESS NOTES
Pt resting comfortably in bed with 2 rails up. Denies any request at this time. All questions answered. Call light and bedside table in reach, along with all personal items.  No request or concerns at this time care on going

## 2025-01-22 NOTE — DISCHARGE INSTRUCTIONS
American Diabetes Association:     About Diabetes: https://diabetes.org/about-diabetes     Life with Diabetes: https://diabetes.org/living-with-diabetes     Health & Wellness: https://diabetes.org/health-wellness     Food & Nutrition: https://diabetes.org/food-nutrition     Tools & Resources: https://diabetes.org/tools-resources  __________________________________________________

## 2025-01-22 NOTE — PROGRESS NOTES
Pulmonary Progress Note    Date of Admission: 1/18/2025   LOS: 4 days       CC:  Chief Complaint   Patient presents with    Blood Sugar Problem     Hyperglycemia with confusion, tachycardia, tachypnea.  Had been non-compliant with medications until 1 week ago.    Fever     Fever of unspecified cause, family reports no known sick contacts.        Subjective:  Continues to improve daily       Assessment:          Plan:     This note may have been transcribed using Dragon Dictation software. Please disregard any translational errors.       Hospital Day: 4     Multifocal pneumonia, Legionella  Levaquin  Steroids for severe pneumonia   prednisone 20 mg x 3 days  Follow procal.   Hypoxemia quickly improving.    Weaned to 90% saturation  With continued improvement, the patient can likely be discharged.     Home O2 eval has been ordered          Data:        PHYSICAL EXAM:   Blood pressure (!) 159/95, pulse 73, temperature 97.6 °F (36.4 °C), temperature source Oral, resp. rate 24, height 1.854 m (6' 1\"), weight 102.9 kg (226 lb 13.7 oz), SpO2 90%.'  Body mass index is 29.93 kg/m².   Gen: No distress.    ENT:   Resp: No accessory muscle use. No crackles. No wheezes. No rhonchi.    CV: Regular rate. Regular rhythm. No murmur or rub. No edema.   Skin: Warm, dry, normal texture and turgor. No nodule on exposed extremities.   M/S: No cyanosis. No clubbing. No joint deformity.  Psych: Oriented x 3. No anxiety.  Awake. Alert. Intact judgement and insight. Good Mood / Affect.  Memory appears in tact       Medications:    Scheduled Meds:   insulin glargine  35 Units SubCUTAneous Daily    insulin lispro  9 Units SubCUTAneous TID WC    predniSONE  20 mg Oral Daily    levofloxacin  250 mg IntraVENous Q24H    And    levofloxacin  500 mg IntraVENous Q24H    pantoprazole  40 mg Oral QAM AC    ipratropium 0.5 mg-albuterol 2.5 mg  1 Dose Inhalation Q4H WA RT    sodium chloride flush  5-40 mL IntraVENous 2 times per day    budesonide  0.5 mg

## 2025-01-22 NOTE — PROGRESS NOTES
[facility]  Glycemic Control      NAME: Srini Recio  MEDICAL RECORD NUMBER:  [id number]  AGE: 54 y.o.   GENDER: male  : 1970  EPISODE DATE:  2025     Data     Recent Labs     25  0821 25  1213 25  1710 25  0747 25  1210   POCGLU 238* 285* 292* 302* 128* 149*       HgBA1c:    Lab Results   Component Value Date/Time    LABA1C 12.3 2025 05:27 AM       BUN/Creatinine:    Lab Results   Component Value Date/Time    BUN 26 2025 04:32 AM    CREATININE 1.3 2025 04:32 AM     GFR Estimated Creatinine Clearance: 80 mL/min (based on SCr of 1.3 mg/dL).    FIB-4 Calculation: 0.8 at 2025  4:32 AM  Calculated from:  SGOT/AST: 46 U/L at 2025  4:32 AM  SGPT/ALT: 43 U/L at 2025  4:32 AM  Platelets: 489 K/uL at 2025  4:32 AM  Age: 54 years    Medications  Scheduled Medications:   insulin glargine  35 Units SubCUTAneous Daily    insulin lispro  9 Units SubCUTAneous TID WC    predniSONE  20 mg Oral Daily    levofloxacin  250 mg IntraVENous Q24H    And    levofloxacin  500 mg IntraVENous Q24H    pantoprazole  40 mg Oral QAM AC    ipratropium 0.5 mg-albuterol 2.5 mg  1 Dose Inhalation Q4H WA RT    sodium chloride flush  5-40 mL IntraVENous 2 times per day    budesonide  0.5 mg Nebulization BID RT    guaiFENesin  600 mg Oral BID    enoxaparin  40 mg SubCUTAneous Daily    insulin lispro  0-16 Units SubCUTAneous 4x Daily AC & HS       Diet  Current diet/supplement order: ADULT DIET; Regular; 4 carb choices (60 gm/meal)     Recorded PO: PO Meals Eaten (%): 51 - 75% last meal in flowsheets      Action      Met with pt. Pt spoke to this writer about his food he eats at home. Pt stated he eats heathy and gets his protein through other measures including beans and kale. Offered to review portion sizes. Pt voiced he is aware how to measure/monitor his portion sizes. Pt stated he has an upcoming appt with his PCP next week. Pt spoke

## 2025-01-22 NOTE — CARE COORDINATION
DISCHARGE PLANNING:    Patient qualifies for 2 lpm of home oxygen.  Patient currently does not have insurance.  Referral to [employer] to see how much private pay oxygen will cost.  Awaiting response.      #[phone]  Electronically signed by Haylee Morales RN on 1/22/2025 at 12:38 PM    Home oxygen private pay is $150 a month.  Will notify patient at bedside.    #[phone]  Electronically signed by Haylee Morales RN on 1/22/2025 at 1:55 PM    Referral sent to [employer] to fulfill home oxygen.  [facility] will contact patient.    #[phone]  Electronically signed by Haylee Morales RN on 1/22/2025 at 2:15 PM

## 2025-01-22 NOTE — PROGRESS NOTES
Home O2 evaluation    Pt qualifies for 2lpm Home oxygen       01/22/25 1152   Resting (Room Air)   SpO2 89   HR 84   Resting (On O2)   SpO2 95   HR 84   O2 Device Nasal cannula   O2 Flow Rate (l/min) 1 l/min   During Walk (Room Air)   SpO2 85   HR 98   Rate of Dyspnea 0   Symptoms   (no shortness of breath noted)   During Walk (On O2)   SpO2 90   HR 97   O2 Device Nasal cannula   O2 Flow Rate (l/min) 2 l/min   Rate of Dyspnea 0   After Walk   SpO2 96   HR 95   O2 Device Nasal cannula   O2 Flow Rate (l/min) 2 l/min   Does the Patient Qualify for Home O2 Yes   Liter Flow at Rest 2   Liter Flow on Exertion 2   Does the Patient Need Portable Oxygen Tanks Yes

## 2025-01-23 VITALS
TEMPERATURE: 97.3 F | OXYGEN SATURATION: 97 % | DIASTOLIC BLOOD PRESSURE: 91 MMHG | SYSTOLIC BLOOD PRESSURE: 183 MMHG | HEART RATE: 73 BPM | HEIGHT: 73 IN | RESPIRATION RATE: 18 BRPM | WEIGHT: 225.53 LBS | BODY MASS INDEX: 29.89 KG/M2

## 2025-01-23 LAB
BACTERIA BLD CULT ORG #2: NORMAL
BACTERIA BLD CULT: NORMAL
C PEPTIDE SERPL-MCNC: 0.7 NG/ML (ref 1.1–4.4)
DEPRECATED RDW RBC AUTO: 15.7 % (ref 12.4–15.4)
GLUCOSE BLD-MCNC: 106 MG/DL (ref 70–99)
GLUCOSE BLD-MCNC: 156 MG/DL (ref 70–99)
HCT VFR BLD AUTO: 31.9 % (ref 40.5–52.5)
HGB BLD-MCNC: 10.2 G/DL (ref 13.5–17.5)
MCH RBC QN AUTO: 21.5 PG (ref 26–34)
MCHC RBC AUTO-ENTMCNC: 31.9 G/DL (ref 31–36)
MCV RBC AUTO: 67.5 FL (ref 80–100)
PATH INTERP BLD-IMP: NO
PERFORMED ON: ABNORMAL
PERFORMED ON: ABNORMAL
PLATELET # BLD AUTO: 602 K/UL (ref 135–450)
PMV BLD AUTO: 8.8 FL (ref 5–10.5)
PROCALCITONIN SERPL IA-MCNC: 1.04 NG/ML (ref 0–0.15)
RBC # BLD AUTO: 4.73 M/UL (ref 4.2–5.9)
WBC # BLD AUTO: 17.9 K/UL (ref 4–11)

## 2025-01-23 PROCEDURE — 6370000000 HC RX 637 (ALT 250 FOR IP): Performed by: INTERNAL MEDICINE

## 2025-01-23 PROCEDURE — 36415 COLL VENOUS BLD VENIPUNCTURE: CPT

## 2025-01-23 PROCEDURE — 6370000000 HC RX 637 (ALT 250 FOR IP): Performed by: FAMILY MEDICINE

## 2025-01-23 PROCEDURE — 2700000000 HC OXYGEN THERAPY PER DAY

## 2025-01-23 PROCEDURE — 85027 COMPLETE CBC AUTOMATED: CPT

## 2025-01-23 PROCEDURE — 94640 AIRWAY INHALATION TREATMENT: CPT

## 2025-01-23 PROCEDURE — 2500000003 HC RX 250 WO HCPCS: Performed by: HOSPITALIST

## 2025-01-23 PROCEDURE — 6360000002 HC RX W HCPCS: Performed by: FAMILY MEDICINE

## 2025-01-23 PROCEDURE — 94761 N-INVAS EAR/PLS OXIMETRY MLT: CPT

## 2025-01-23 PROCEDURE — 84145 PROCALCITONIN (PCT): CPT

## 2025-01-23 RX ORDER — INSULIN GLARGINE 100 [IU]/ML
25 INJECTION, SOLUTION SUBCUTANEOUS NIGHTLY
Qty: 5 ADJUSTABLE DOSE PRE-FILLED PEN SYRINGE | Refills: 0 | Status: SHIPPED | OUTPATIENT
Start: 2025-01-23

## 2025-01-23 RX ORDER — PREDNISONE 20 MG/1
20 TABLET ORAL DAILY
Qty: 1 TABLET | Refills: 0 | Status: SHIPPED | OUTPATIENT
Start: 2025-01-24 | End: 2025-01-25

## 2025-01-23 RX ORDER — INSULIN LISPRO 100 [IU]/ML
6 INJECTION, SOLUTION INTRAVENOUS; SUBCUTANEOUS
Qty: 5 ADJUSTABLE DOSE PRE-FILLED PEN SYRINGE | Refills: 0 | Status: SHIPPED | OUTPATIENT
Start: 2025-01-23

## 2025-01-23 RX ADMIN — SODIUM CHLORIDE, PRESERVATIVE FREE 10 ML: 5 INJECTION INTRAVENOUS at 08:49

## 2025-01-23 RX ADMIN — PANTOPRAZOLE SODIUM 40 MG: 40 TABLET, DELAYED RELEASE ORAL at 05:21

## 2025-01-23 RX ADMIN — BUDESONIDE INHALATION 500 MCG: 0.5 SUSPENSION RESPIRATORY (INHALATION) at 07:18

## 2025-01-23 RX ADMIN — IPRATROPIUM BROMIDE AND ALBUTEROL SULFATE 1 DOSE: 2.5; .5 SOLUTION RESPIRATORY (INHALATION) at 11:42

## 2025-01-23 RX ADMIN — INSULIN LISPRO 9 UNITS: 100 INJECTION, SOLUTION INTRAVENOUS; SUBCUTANEOUS at 10:09

## 2025-01-23 RX ADMIN — PREDNISONE 20 MG: 20 TABLET ORAL at 08:49

## 2025-01-23 RX ADMIN — GUAIFENESIN 600 MG: 600 TABLET ORAL at 08:48

## 2025-01-23 RX ADMIN — IPRATROPIUM BROMIDE AND ALBUTEROL SULFATE 1 DOSE: 2.5; .5 SOLUTION RESPIRATORY (INHALATION) at 07:18

## 2025-01-23 RX ADMIN — INSULIN GLARGINE 35 UNITS: 100 INJECTION, SOLUTION SUBCUTANEOUS at 10:10

## 2025-01-23 NOTE — PROGRESS NOTES
Pt is being discharged home with wife and child. Oxygen was provided by [facility]. AVS reviewed with patient and wife. [facility] Case Management was notified.     Electronically signed by DAMIAN CONNER RN on 1/23/2025 at 2:24 PM

## 2025-01-23 NOTE — PROGRESS NOTES
CLINICAL PHARMACY NOTE: MEDS TO BEDS    Per patient request, all discharge meds will be transferred and filled at [facility]

## 2025-01-23 NOTE — CARE COORDINATION
Case Management Discharge Note          Date / Time of Note: 1/23/2025 10:23 AM                  Patient Name: Srini Recio   YOB: 1970  Diagnosis: Confusion [R41.0]  Elevated troponin [R79.89]  PAT (acute kidney injury) (HCC) [N17.9]  Acute respiratory failure with hypoxia [J96.01]  Sepsis (HCC) [A41.9]  Severe sepsis (HCC) [A41.9, R65.20]  Type 2 diabetes mellitus with hyperglycemia, with long-term current use of insulin (HCC) [E11.65, Z79.4]  Community acquired pneumonia of right lung, unspecified part of lung [J18.9]   Date / Time: 1/18/2025  8:46 PM    Financial:  Payor: /      Pharmacy:  No Pharmacies Listed    Assistance purchasing medications?: Potential Assistance Purchasing Medications: Yes  Assistance provided by Case Management: None at this time    DISCHARGE Disposition: Home- No Services Needed    Home Oxygen and Respiratory Equipment:  Oxygen needed at discharge?: Yes  Home Oxygen Company: [email or web address] Phone: [phone]   Portable tank available for discharge?: Yes    Transportation:  Transportation PLAN for discharge: family   Mode of Transport: Private Car  Time of Transport: will call wife    IMM Completed:   Not Indicated    Additional CM Notes:   DC order noted.  [email or web address] for home oxygen, private pay, awaiting payment.  Wife will transport home.      The Plan for Transition of Care is related to the following treatment goals of Confusion [R41.0]  Elevated troponin [R79.89]  PAT (acute kidney injury) (HCC) [N17.9]  Acute respiratory failure with hypoxia [J96.01]  Sepsis (HCC) [A41.9]  Severe sepsis (HCC) [A41.9, R65.20]  Type 2 diabetes mellitus with hyperglycemia, with long-term current use of insulin (HCC) [E11.65, Z79.4]  Community acquired pneumonia of right lung, unspecified part of lung [J18.9]    The Patient and/or patient representative Srini and his family were provided with a choice of provider and agrees with the discharge plan Yes    Freedom of choice list was

## 2025-01-23 NOTE — PROGRESS NOTES
[facility]  Glycemic Control      NAME: Srini Recio  MEDICAL RECORD NUMBER:  [id number]  AGE: 54 y.o.   GENDER: male  : 1970  EPISODE DATE:  2025     Data     Recent Labs     25  0747 25  1210 25  1732 25  0808   POCGLU 302* 128* 149* 176* 187* 106*       HgBA1c:    Lab Results   Component Value Date/Time    LABA1C 12.3 2025 05:27 AM       BUN/Creatinine:    Lab Results   Component Value Date/Time    BUN 26 2025 04:32 AM    CREATININE 1.3 2025 04:32 AM     GFR Estimated Creatinine Clearance: 80 mL/min (based on SCr of 1.3 mg/dL).    FIB-4 Calculation: 0.65 at 2025  5:28 AM  Calculated from:  SGOT/AST: 46 U/L at 2025  4:32 AM  SGPT/ALT: 43 U/L at 2025  4:32 AM  Platelets: 602 K/uL at 2025  5:28 AM  Age: 54 years    Medications  Scheduled Medications:   insulin glargine  35 Units SubCUTAneous Daily    insulin lispro  9 Units SubCUTAneous TID WC    predniSONE  20 mg Oral Daily    levofloxacin  250 mg IntraVENous Q24H    And    levofloxacin  500 mg IntraVENous Q24H    pantoprazole  40 mg Oral QAM AC    ipratropium 0.5 mg-albuterol 2.5 mg  1 Dose Inhalation Q4H WA RT    sodium chloride flush  5-40 mL IntraVENous 2 times per day    budesonide  0.5 mg Nebulization BID RT    guaiFENesin  600 mg Oral BID    enoxaparin  40 mg SubCUTAneous Daily    insulin lispro  0-16 Units SubCUTAneous 4x Daily AC & HS       Diet  Current diet/supplement order: ADULT DIET; Regular; 4 carb choices (60 gm/meal)     Recorded PO: PO Meals Eaten (%): 76 - 100% last meal in flowsheets      Action      Met with pt. Pt stated he will \"eat until I am full\". Pt aware of plan for possible discharge today with alex meds due to currently not having benefits. Pt aware of referral to [facility]- pt stated he spoke with his wife about referral previous day. Pt has contact information to reach out to [facility] to check

## 2025-01-25 LAB
GAD65 AB SER IA-ACNC: <5 IU/ML (ref 0–5)
ZNT8 AB SERPL IA-ACNC: <10 U/ML (ref 0–15)

## 2025-01-29 LAB — INSULIN HUMAN AB SER-ACNC: <0.4 U/ML (ref 0–0.4)

## 2025-04-22 ENCOUNTER — PATIENT MESSAGE (OUTPATIENT)
Dept: PULMONOLOGY | Age: 55
End: 2025-04-22

## 2025-04-29 ENCOUNTER — PATIENT MESSAGE (OUTPATIENT)
Dept: PULMONOLOGY | Age: 55
End: 2025-04-29

## (undated) DEVICE — SHOE POSTOP XL M 12.5+ WOM 13.5+ SQUARED OPN TOE CLS HEEL

## (undated) DEVICE — SOLUTION SCRB 4OZ 7.5% POVIDONE IOD ANTIMIC BTL

## (undated) DEVICE — MERCY HEALTH WEST TURNOVER: Brand: MEDLINE INDUSTRIES, INC.

## (undated) DEVICE — PODIATRY: Brand: MEDLINE INDUSTRIES, INC.

## (undated) DEVICE — 3M™ COBAN™ NL STERILE NON-LATEX SELF-ADHERENT WRAP, 2084S, 4 IN X 5 YD (10 CM X 4,5 M), 18 ROLLS/CASE: Brand: 3M™ COBAN™

## (undated) DEVICE — THIN OFFSET (9.0 X 0.38 X 25.0MM)

## (undated) DEVICE — GOWN,SIRUS,POLYRNF,BRTHSLV,XL,30/CS: Brand: MEDLINE

## (undated) DEVICE — SOLUTION PREP PAINT POV IOD FOR SKIN MUCOUS MEM

## (undated) DEVICE — SOLUTION IRRIG 3000ML 0.9% SOD CHL USP UROMATIC PLAS CONT

## (undated) DEVICE — BANDAGE COMPR W4INXL15FT BGE E SGL LAYERED CLP CLSR

## (undated) DEVICE — TRANSFER SET 3": Brand: MEDLINE INDUSTRIES, INC.

## (undated) DEVICE — TOURNIQUET PHLEB M AD FNGR RED SIL RNG DISP TOURNI-COT

## (undated) DEVICE — DRAPE,REIN 53X77,STERILE: Brand: MEDLINE

## (undated) DEVICE — GLOVE SURG SZ 85 CRM LTX FREE POLYISOPRENE POLYMER BEAD ANTI

## (undated) DEVICE — PREMIUM DRY TRAY LF: Brand: MEDLINE INDUSTRIES, INC.

## (undated) DEVICE — PADDING,UNDERCAST,COTTON, 4"X4YD STERILE: Brand: MEDLINE

## (undated) DEVICE — GLOVE,SURG,SENSICARE SLT,LF,PF,8: Brand: MEDLINE

## (undated) DEVICE — ELECTRODE PT RET AD L9FT HI MOIST COND ADH HYDRGEL CORDED

## (undated) DEVICE — BANDAGE GZ W2XL75IN ST RAYON POLY CNFRM STRTCH LTWT

## (undated) DEVICE — DRESSING,GAUZE,XEROFORM,CURAD,1"X8",ST: Brand: CURAD

## (undated) DEVICE — STOCKINETTE,IMPERVIOUS,12X48,STERILE: Brand: MEDLINE

## (undated) DEVICE — HANDPIECE SET WITH HIGH FLOW TIP AND SUCTION TUBE: Brand: INTERPULSE

## (undated) DEVICE — GLOVE,SURG,SENSICARE SLT,LF,PF,7.5: Brand: MEDLINE

## (undated) DEVICE — SPONGE LAP W18XL18IN WHT COT 4 PLY FLD STRUNG RADPQ DISP ST 2 PER PACK